# Patient Record
Sex: MALE | Race: WHITE | NOT HISPANIC OR LATINO | Employment: OTHER | URBAN - METROPOLITAN AREA
[De-identification: names, ages, dates, MRNs, and addresses within clinical notes are randomized per-mention and may not be internally consistent; named-entity substitution may affect disease eponyms.]

---

## 2017-01-08 ENCOUNTER — OFFICE VISIT (OUTPATIENT)
Dept: FAMILY MEDICINE | Facility: CLINIC | Age: 62
End: 2017-01-08
Payer: COMMERCIAL

## 2017-01-08 VITALS
HEIGHT: 70 IN | SYSTOLIC BLOOD PRESSURE: 131 MMHG | HEART RATE: 89 BPM | BODY MASS INDEX: 22.82 KG/M2 | DIASTOLIC BLOOD PRESSURE: 80 MMHG | WEIGHT: 159.38 LBS

## 2017-01-08 DIAGNOSIS — J06.9 UPPER RESPIRATORY TRACT INFECTION, UNSPECIFIED TYPE: Primary | ICD-10-CM

## 2017-01-08 PROCEDURE — 1159F MED LIST DOCD IN RCRD: CPT | Mod: S$GLB,,, | Performed by: FAMILY MEDICINE

## 2017-01-08 PROCEDURE — 99213 OFFICE O/P EST LOW 20 MIN: CPT | Mod: S$GLB,,, | Performed by: FAMILY MEDICINE

## 2017-01-08 PROCEDURE — 99999 PR PBB SHADOW E&M-EST. PATIENT-LVL III: CPT | Mod: PBBFAC,,, | Performed by: FAMILY MEDICINE

## 2017-01-15 NOTE — PROGRESS NOTES
HISTORY OF PRESENT ILLNESS:  A pleasant male with nasal congestion for several   days.  No aches, chills, fever, or cough.  No sore throat.  He has had some   symptoms of eustachian tube dysfunction.    PHYSICAL EXAMINATION:  HEENT:  He has nasal congestion.  No pharyngitis, exudate, or lymphadenopathy.    No otitis media.  CHEST:  Clear.    ASSESSMENT:  Upper respiratory infection with eustachian tube dysfunction.    PLAN:  We discussed decongestants, nasal sprays, humidification and followup.      [facility]  dd: 01/15/2017 09:33:52 (CST)  td: 01/15/2017 17:07:13 (CST)  Doc ID   #[id number]  Job ID #[id number]    CC:

## 2017-10-17 ENCOUNTER — OFFICE VISIT (OUTPATIENT)
Dept: UROLOGY | Facility: CLINIC | Age: 62
End: 2017-10-17
Payer: COMMERCIAL

## 2017-10-17 ENCOUNTER — LAB VISIT (OUTPATIENT)
Dept: LAB | Facility: HOSPITAL | Age: 62
End: 2017-10-17
Payer: COMMERCIAL

## 2017-10-17 VITALS
SYSTOLIC BLOOD PRESSURE: 126 MMHG | HEIGHT: 70 IN | BODY MASS INDEX: 22.98 KG/M2 | DIASTOLIC BLOOD PRESSURE: 70 MMHG | HEART RATE: 81 BPM | WEIGHT: 160.5 LBS

## 2017-10-17 DIAGNOSIS — N40.1 BPH WITH URINARY OBSTRUCTION: ICD-10-CM

## 2017-10-17 DIAGNOSIS — N13.8 BPH WITH URINARY OBSTRUCTION: ICD-10-CM

## 2017-10-17 DIAGNOSIS — N40.1 BPH WITH URINARY OBSTRUCTION: Primary | ICD-10-CM

## 2017-10-17 DIAGNOSIS — N13.8 BPH WITH URINARY OBSTRUCTION: Primary | ICD-10-CM

## 2017-10-17 LAB
BILIRUB SERPL-MCNC: NORMAL MG/DL
BLOOD URINE, POC: NORMAL
COLOR, POC UA: YELLOW
COMPLEXED PSA SERPL-MCNC: 1.1 NG/ML
GLUCOSE UR QL STRIP: NORMAL
KETONES UR QL STRIP: NORMAL
LEUKOCYTE ESTERASE URINE, POC: NORMAL
NITRITE, POC UA: NORMAL
PH, POC UA: NORMAL
PROTEIN, POC: NORMAL
SPECIFIC GRAVITY, POC UA: 7
UROBILINOGEN, POC UA: NORMAL

## 2017-10-17 PROCEDURE — 81002 URINALYSIS NONAUTO W/O SCOPE: CPT | Mod: S$GLB,,, | Performed by: NURSE PRACTITIONER

## 2017-10-17 PROCEDURE — 99999 PR PBB SHADOW E&M-EST. PATIENT-LVL III: CPT | Mod: PBBFAC,,, | Performed by: NURSE PRACTITIONER

## 2017-10-17 PROCEDURE — 36415 COLL VENOUS BLD VENIPUNCTURE: CPT

## 2017-10-17 PROCEDURE — 99214 OFFICE O/P EST MOD 30 MIN: CPT | Mod: 25,S$GLB,, | Performed by: NURSE PRACTITIONER

## 2017-10-17 PROCEDURE — 84153 ASSAY OF PSA TOTAL: CPT

## 2017-10-17 RX ORDER — TAMSULOSIN HYDROCHLORIDE 0.4 MG/1
0.4 CAPSULE ORAL NIGHTLY
Qty: 90 CAPSULE | Refills: 3 | Status: SHIPPED | OUTPATIENT
Start: 2017-10-17 | End: 2018-12-12 | Stop reason: SDUPTHER

## 2017-10-17 NOTE — PROGRESS NOTES
Subjective:       Patient ID: Fletcher Nunez is a 62 y.o. male.    Chief Complaint: Benign Prostatic Hypertrophy    Fletcher Nunez is a 62 y.o. Male with BPH.  He was last seen in clinic with Dr. Hurtado 10/03/2016.  He has been taking Flomax for his LUTS  He has no family history of Prostate Cancer.    He is pleased with how he urinates.  No issues with ED.                                     PSA                  0.82                09/29/2016                 PSA                  0.82                10/02/2015                 PSA                  0.93                10/01/2014                 PSA                  0.98                10/07/2013                 PSA                      0.88                10/24/2012                 PSA                      0.88                05/26/2010          Past Medical History:  No date: BPH (benign prostatic hyperplasia)  No date: Positive PPD, treated      Comment: hx of childhood     Past Surgical History:  No date: CHOLECYSTECTOMY    Review of patient's family history indicates:    Heart disease                  Father                    Diabetes                       Father                    Heart disease                  Sister                    Diabetes                       Sister                    Hyperlipidemia                 Sister                    Hypertension                   Sister                    Cancer                         Maternal Grandmother        Comment: stomach cancer     Cancer                         Sister                      Comment: renal cancer       Social History    Marital status:              Spouse name:                       Years of education:                 Number of children:               Occupational History    None on file    Social History Main Topics    Smoking status: Never Smoker                                                                   Smokeless tobacco: Not on file                       Alcohol use: Not on  file     Drug use: Not on file     Sexual activity: Not on file          Other Topics            Concern    None on file    Social History Narrative    None on file        Allergies:  Review of patient's allergies indicates no known allergies.    Medications:  Current Outpatient Prescriptions:   tamsulosin (FLOMAX) 0.4 mg Cp24, Take 1 capsule (0.4 mg total) by mouth every evening., Disp: 90 capsule, Rfl: 3        Review of Systems   Constitutional: Negative.  Negative for activity change, appetite change and fever.   HENT: Negative.  Negative for facial swelling and trouble swallowing.    Eyes: Negative.    Respiratory: Negative.  Negative for shortness of breath.    Cardiovascular: Negative.  Negative for chest pain and palpitations.   Gastrointestinal: Negative for abdominal pain, constipation, diarrhea, nausea and vomiting.   Genitourinary: Negative for difficulty urinating, dysuria, enuresis, flank pain, frequency, genital sores, hematuria, nocturia, penile pain, scrotal swelling, testicular pain and urgency.        Please with how he urinates.     Musculoskeletal: Negative for back pain, gait problem and neck stiffness.   Skin: Negative.  Negative for wound.   Neurological: Negative for dizziness, tremors, seizures, syncope, speech difficulty, light-headedness and headaches.   Hematological: Does not bruise/bleed easily.   Psychiatric/Behavioral: Negative for confusion and hallucinations. The patient is not nervous/anxious.        Objective:      Physical Exam   Nursing note and vitals reviewed.  Constitutional: He is oriented to person, place, and time. Vital signs are normal. He appears well-developed and well-nourished. He is active and cooperative.  Non-toxic appearance. He does not have a sickly appearance.   Urine dipped clear of infection in the office today     HENT:   Head: Normocephalic and atraumatic.   Right Ear: External ear normal.   Left Ear: External ear normal.   Nose: Nose normal.    Mouth/Throat: Mucous membranes are normal.   Eyes: Conjunctivae and lids are normal. No scleral icterus.   Neck: Trachea normal, normal range of motion and full passive range of motion without pain. Neck supple. No JVD present. No tracheal deviation present.   Cardiovascular: Normal rate, regular rhythm, S1 normal and S2 normal.    Pulmonary/Chest: Effort normal and breath sounds normal. No respiratory distress. He exhibits no tenderness.   Abdominal: Soft. Normal appearance and bowel sounds are normal. There is no hepatosplenomegaly. There is no tenderness. There is no rebound, no guarding and no CVA tenderness.   Genitourinary: Rectum normal, testes normal and penis normal. Rectal exam shows no external hemorrhoid, no mass and no tenderness. Prostate is enlarged.       Musculoskeletal: Normal range of motion.   Neurological: He is alert and oriented to person, place, and time. He has normal strength.   Skin: Skin is warm, dry and intact.     Psychiatric: He has a normal mood and affect. His behavior is normal. Judgment and thought content normal.       Assessment:       1. BPH with urinary obstruction        Plan:         I spent 25 minutes with the patient of which more than half was spent in direct consultation with the patient in regards to our treatment and plan.    Education and recommendations of today's plan of care including home remedies.  We discussed PSA results.  Diet modifications  Ok with how he urinates; continue Flomax;   He changing to medicare in January 2018; should not be problem but will let us know if pharmacy changes.  PSA today.  RTC one year with PSA

## 2017-10-18 ENCOUNTER — TELEPHONE (OUTPATIENT)
Dept: UROLOGY | Facility: CLINIC | Age: 62
End: 2017-10-18

## 2018-10-01 ENCOUNTER — LAB VISIT (OUTPATIENT)
Dept: LAB | Facility: HOSPITAL | Age: 63
End: 2018-10-01
Attending: NURSE PRACTITIONER
Payer: MEDICARE

## 2018-10-01 ENCOUNTER — CLINICAL SUPPORT (OUTPATIENT)
Dept: FAMILY MEDICINE | Facility: CLINIC | Age: 63
End: 2018-10-01
Payer: MEDICARE

## 2018-10-01 DIAGNOSIS — Z23 NEED FOR PROPHYLACTIC VACCINATION AND INOCULATION AGAINST INFLUENZA: Primary | ICD-10-CM

## 2018-10-01 DIAGNOSIS — N40.1 BPH WITH URINARY OBSTRUCTION: ICD-10-CM

## 2018-10-01 DIAGNOSIS — N13.8 BPH WITH URINARY OBSTRUCTION: ICD-10-CM

## 2018-10-01 LAB — COMPLEXED PSA SERPL-MCNC: 1.1 NG/ML

## 2018-10-01 PROCEDURE — 90662 IIV NO PRSV INCREASED AG IM: CPT | Mod: PBBFAC,PO | Performed by: INTERNAL MEDICINE

## 2018-10-01 PROCEDURE — 84153 ASSAY OF PSA TOTAL: CPT

## 2018-10-01 PROCEDURE — 36415 COLL VENOUS BLD VENIPUNCTURE: CPT | Mod: PO

## 2018-10-01 PROCEDURE — 99499 UNLISTED E&M SERVICE: CPT | Mod: S$PBB,,, | Performed by: INTERNAL MEDICINE

## 2018-10-09 ENCOUNTER — OFFICE VISIT (OUTPATIENT)
Dept: UROLOGY | Facility: CLINIC | Age: 63
End: 2018-10-09
Payer: MEDICARE

## 2018-10-09 VITALS
HEART RATE: 80 BPM | SYSTOLIC BLOOD PRESSURE: 128 MMHG | HEIGHT: 70 IN | BODY MASS INDEX: 23.6 KG/M2 | WEIGHT: 164.88 LBS | DIASTOLIC BLOOD PRESSURE: 79 MMHG

## 2018-10-09 DIAGNOSIS — N13.8 BPH WITH URINARY OBSTRUCTION: Primary | ICD-10-CM

## 2018-10-09 DIAGNOSIS — N40.1 BPH WITH URINARY OBSTRUCTION: Primary | ICD-10-CM

## 2018-10-09 PROCEDURE — 99999 PR PBB SHADOW E&M-EST. PATIENT-LVL III: CPT | Mod: PBBFAC,,, | Performed by: UROLOGY

## 2018-10-09 PROCEDURE — 1101F PT FALLS ASSESS-DOCD LE1/YR: CPT | Mod: CPTII,,, | Performed by: UROLOGY

## 2018-10-09 PROCEDURE — 99213 OFFICE O/P EST LOW 20 MIN: CPT | Mod: PBBFAC,PO | Performed by: UROLOGY

## 2018-10-09 PROCEDURE — 99214 OFFICE O/P EST MOD 30 MIN: CPT | Mod: S$PBB,,, | Performed by: UROLOGY

## 2018-10-09 PROCEDURE — 3008F BODY MASS INDEX DOCD: CPT | Mod: CPTII,,, | Performed by: UROLOGY

## 2018-10-09 NOTE — PROGRESS NOTES
Subjective:       Patient ID: Fletcher Nunez is a 63 y.o. male.    Chief Complaint: Benign Prostatic Hypertrophy (annual visit)    HPI patient is here for prostate check.  His PSA is 1.1.  He is happy with his voiding.  He is on Flomax.    Past Medical History:   Diagnosis Date    BPH (benign prostatic hyperplasia)     Positive PPD, treated     hx of childhood        Past Surgical History:   Procedure Laterality Date    CHOLECYSTECTOMY         Family History   Problem Relation Age of Onset    Heart disease Father     Diabetes Father     Heart disease Sister     Diabetes Sister     Hyperlipidemia Sister     Hypertension Sister     Cancer Maternal Grandmother         stomach cancer     Cancer Sister         renal cancer        Social History     Socioeconomic History    Marital status:      Spouse name: Not on file    Number of children: Not on file    Years of education: Not on file    Highest education level: Not on file   Social Needs    Financial resource strain: Not on file    Food insecurity - worry: Not on file    Food insecurity - inability: Not on file    Transportation needs - medical: Not on file    Transportation needs - non-medical: Not on file   Occupational History    Not on file   Tobacco Use    Smoking status: Never Smoker   Substance and Sexual Activity    Alcohol use: Not on file    Drug use: Not on file    Sexual activity: Not on file   Other Topics Concern    Not on file   Social History Narrative    Not on file       Allergies:  Patient has no known allergies.    Medications:    Current Outpatient Medications:     tamsulosin (FLOMAX) 0.4 mg Cp24, Take 1 capsule (0.4 mg total) by mouth every evening., Disp: 90 capsule, Rfl: 3    Review of Systems   Constitutional: Negative for activity change, appetite change, chills, diaphoresis, fatigue, fever and unexpected weight change.   HENT: Negative for congestion, dental problem, hearing loss, mouth sores, postnasal drip,  rhinorrhea, sinus pressure and trouble swallowing.    Eyes: Negative for pain, discharge and itching.   Respiratory: Negative for apnea, cough, choking, chest tightness, shortness of breath and wheezing.    Cardiovascular: Negative for chest pain, palpitations and leg swelling.   Gastrointestinal: Negative for abdominal distention, abdominal pain, anal bleeding, blood in stool, constipation, diarrhea, nausea, rectal pain and vomiting.   Endocrine: Negative for polydipsia and polyuria.   Genitourinary: Negative for decreased urine volume, difficulty urinating, discharge, dysuria, enuresis, flank pain, frequency, genital sores, hematuria, penile pain, penile swelling, scrotal swelling, testicular pain and urgency.   Musculoskeletal: Negative for arthralgias, back pain and myalgias.   Skin: Negative for color change, rash and wound.   Neurological: Negative for dizziness, syncope, speech difficulty, light-headedness and headaches.   Hematological: Negative for adenopathy. Does not bruise/bleed easily.   Psychiatric/Behavioral: Negative for behavioral problems, confusion, hallucinations and sleep disturbance.       Objective:      Physical Exam   Constitutional: He appears well-developed.   HENT:   Head: Normocephalic.   Cardiovascular: Normal rate.    Pulmonary/Chest: Effort normal.   Abdominal: Soft.   Genitourinary: Prostate normal.   Genitourinary Comments: 30 g benign   Neurological: He is alert.   Skin: Skin is warm.     Psychiatric: He has a normal mood and affect.       Assessment:       1. BPH with urinary obstruction        Plan:       Fletcher was seen today for benign prostatic hypertrophy.    Diagnoses and all orders for this visit:    BPH with urinary obstruction      yearly check

## 2018-12-12 RX ORDER — TAMSULOSIN HYDROCHLORIDE 0.4 MG/1
CAPSULE ORAL
Qty: 90 CAPSULE | Refills: 3 | Status: SHIPPED | OUTPATIENT
Start: 2018-12-12 | End: 2019-12-23

## 2019-09-24 ENCOUNTER — IMMUNIZATION (OUTPATIENT)
Dept: FAMILY MEDICINE | Facility: CLINIC | Age: 64
End: 2019-09-24
Payer: MEDICARE

## 2019-09-24 ENCOUNTER — OFFICE VISIT (OUTPATIENT)
Dept: UROLOGY | Facility: CLINIC | Age: 64
End: 2019-09-24
Payer: MEDICARE

## 2019-09-24 VITALS
DIASTOLIC BLOOD PRESSURE: 74 MMHG | SYSTOLIC BLOOD PRESSURE: 118 MMHG | BODY MASS INDEX: 22.73 KG/M2 | HEIGHT: 70 IN | HEART RATE: 79 BPM | WEIGHT: 158.75 LBS

## 2019-09-24 DIAGNOSIS — Z23 NEED FOR PROPHYLACTIC VACCINATION AND INOCULATION AGAINST INFLUENZA: Primary | ICD-10-CM

## 2019-09-24 DIAGNOSIS — N13.8 BPH WITH URINARY OBSTRUCTION: Primary | ICD-10-CM

## 2019-09-24 DIAGNOSIS — N40.1 BPH WITH URINARY OBSTRUCTION: Primary | ICD-10-CM

## 2019-09-24 PROCEDURE — G0008 ADMIN INFLUENZA VIRUS VAC: HCPCS | Mod: HCNC,S$GLB,, | Performed by: INTERNAL MEDICINE

## 2019-09-24 PROCEDURE — 99499 UNLISTED E&M SERVICE: CPT | Mod: HCNC,S$GLB,, | Performed by: INTERNAL MEDICINE

## 2019-09-24 PROCEDURE — 90662 FLU VACCINE - HIGH DOSE (65+) PRESERVATIVE FREE IM: ICD-10-PCS | Mod: HCNC,S$GLB,, | Performed by: INTERNAL MEDICINE

## 2019-09-24 PROCEDURE — 1101F PT FALLS ASSESS-DOCD LE1/YR: CPT | Mod: HCNC,CPTII,S$GLB, | Performed by: UROLOGY

## 2019-09-24 PROCEDURE — 99214 PR OFFICE/OUTPT VISIT, EST, LEVL IV, 30-39 MIN: ICD-10-PCS | Mod: 25,HCNC,S$GLB, | Performed by: UROLOGY

## 2019-09-24 PROCEDURE — 99999 PR PBB SHADOW E&M-EST. PATIENT-LVL III: ICD-10-PCS | Mod: PBBFAC,HCNC,, | Performed by: UROLOGY

## 2019-09-24 PROCEDURE — 99999 PR PBB SHADOW E&M-EST. PATIENT-LVL III: CPT | Mod: PBBFAC,HCNC,, | Performed by: UROLOGY

## 2019-09-24 PROCEDURE — G0008 FLU VACCINE - HIGH DOSE (65+) PRESERVATIVE FREE IM: ICD-10-PCS | Mod: HCNC,S$GLB,, | Performed by: INTERNAL MEDICINE

## 2019-09-24 PROCEDURE — 99499 NO LOS: ICD-10-PCS | Mod: HCNC,S$GLB,, | Performed by: INTERNAL MEDICINE

## 2019-09-24 PROCEDURE — 99214 OFFICE O/P EST MOD 30 MIN: CPT | Mod: 25,HCNC,S$GLB, | Performed by: UROLOGY

## 2019-09-24 PROCEDURE — 90662 IIV NO PRSV INCREASED AG IM: CPT | Mod: HCNC,S$GLB,, | Performed by: INTERNAL MEDICINE

## 2019-09-24 PROCEDURE — 1101F PR PT FALLS ASSESS DOC 0-1 FALLS W/OUT INJ PAST YR: ICD-10-PCS | Mod: HCNC,CPTII,S$GLB, | Performed by: UROLOGY

## 2019-09-24 NOTE — PROGRESS NOTES
Subjective:       Patient ID: Fletcher Nunez is a 64 y.o. male.    Chief Complaint: No chief complaint on file.    HPI   Fletcher Nunez is a 64 y.o. male with a PMHx of BPH and elevated PSA who presents to the clinic for his annual prostate evaluation. His PSA 11 months ago was 1.1 and his AUA Sx score is a 5. He is taking Flomax and does not have a FHx of prostate CA. He feels well today.      Past Medical History:   Diagnosis Date    BPH (benign prostatic hyperplasia)     Positive PPD, treated     hx of childhood        Past Surgical History:   Procedure Laterality Date    CHOLECYSTECTOMY         Family History   Problem Relation Age of Onset    Heart disease Father     Diabetes Father     Heart disease Sister     Diabetes Sister     Hyperlipidemia Sister     Hypertension Sister     Cancer Maternal Grandmother         stomach cancer     Cancer Sister         renal cancer        Social History     Socioeconomic History    Marital status:      Spouse name: Not on file    Number of children: Not on file    Years of education: Not on file    Highest education level: Not on file   Occupational History    Not on file   Social Needs    Financial resource strain: Not on file    Food insecurity:     Worry: Not on file     Inability: Not on file    Transportation needs:     Medical: Not on file     Non-medical: Not on file   Tobacco Use    Smoking status: Never Smoker   Substance and Sexual Activity    Alcohol use: Not on file    Drug use: Not on file    Sexual activity: Not on file   Lifestyle    Physical activity:     Days per week: Not on file     Minutes per session: Not on file    Stress: Not on file   Relationships    Social connections:     Talks on phone: Not on file     Gets together: Not on file     Attends [employer] service: Not on file     Active member of club or organization: Not on file     Attends meetings of clubs or organizations: Not on file     Relationship status: Not on  file   Other Topics Concern    Not on file   Social History Narrative    Not on file       Allergies:  Patient has no known allergies.    Medications:    Current Outpatient Medications:     tamsulosin (FLOMAX) 0.4 mg Cap, TAKE 1 CAPSULE AT BEDTIME, Disp: 90 capsule, Rfl: 3    Review of Systems   Constitutional: Negative for activity change, appetite change, chills, diaphoresis, fatigue, fever and unexpected weight change.   HENT: Negative for congestion, dental problem, hearing loss, mouth sores, postnasal drip, rhinorrhea, sinus pressure and trouble swallowing.    Eyes: Negative for pain, discharge and itching.   Respiratory: Negative for apnea, cough, choking, chest tightness, shortness of breath and wheezing.    Cardiovascular: Negative for chest pain, palpitations and leg swelling.   Gastrointestinal: Negative for abdominal distention, abdominal pain, anal bleeding, blood in stool, constipation, diarrhea, nausea, rectal pain and vomiting.   Endocrine: Negative for polydipsia and polyuria.   Genitourinary: Negative for decreased urine volume, difficulty urinating, discharge, dysuria, enuresis, flank pain, frequency, genital sores, hematuria, penile pain, penile swelling, scrotal swelling and urgency.   Musculoskeletal: Negative for arthralgias and back pain.   Skin: Negative for color change, rash and wound.   Neurological: Negative for dizziness, syncope, speech difficulty, light-headedness and headaches.   Hematological: Negative for adenopathy. Does not bruise/bleed easily.   Psychiatric/Behavioral: Negative for behavioral problems and confusion.       Objective:      Physical Exam   Constitutional: He appears well-developed.   HENT:   Head: Normocephalic.   Neck: Neck supple.   Cardiovascular: Normal rate.    Pulmonary/Chest: Effort normal.   Abdominal: Soft.   Genitourinary:   Genitourinary Comments: Prostate was smooth without nodularity. No rectal masses.  30-35 grams, benign. Normal perineum.      Neurological: He is alert.   Skin: Skin is warm.     Psychiatric: He has a normal mood and affect.         Labs   Ref Range & Units 11mo ago   PSA DIAGNOSTIC 0.00 - 4.00 ng/mL 1.1        Assessment:       1. BPH with urinary obstruction        Plan:       Diagnoses and all orders for this visit:    BPH with urinary obstruction  -     Prostate Specific Antigen, Diagnostic; Future          RTC in 1 year for VAISHALI    ICammie, am acting as a scribe on this patient encounter in the presence and under the supervision of Dr. Hurtado.    09/24/2019 9:06 AM    I, Dr. Hurtado, personally performed the services described in this documentation.   All medical record entries made by the scribe were at my direction and in my presence.   I have reviewed the chart and agree that the record is accurate and complete.   Barrington Hurtado MD.  9:06 AM 09/24/2019

## 2019-12-23 RX ORDER — TAMSULOSIN HYDROCHLORIDE 0.4 MG/1
CAPSULE ORAL
Qty: 90 CAPSULE | Refills: 3 | Status: SHIPPED | OUTPATIENT
Start: 2019-12-23 | End: 2020-12-07 | Stop reason: SDUPTHER

## 2020-04-23 ENCOUNTER — LAB VISIT (OUTPATIENT)
Dept: LAB | Facility: HOSPITAL | Age: 65
End: 2020-04-23
Attending: UROLOGY
Payer: MEDICARE

## 2020-04-23 ENCOUNTER — TELEPHONE (OUTPATIENT)
Dept: UROLOGY | Facility: CLINIC | Age: 65
End: 2020-04-23

## 2020-04-23 DIAGNOSIS — R30.0 DYSURIA: ICD-10-CM

## 2020-04-23 DIAGNOSIS — N40.1 BPH WITH URINARY OBSTRUCTION: Primary | ICD-10-CM

## 2020-04-23 DIAGNOSIS — N13.8 BPH WITH URINARY OBSTRUCTION: Primary | ICD-10-CM

## 2020-04-23 LAB
BILIRUB UR QL STRIP: NEGATIVE
CLARITY UR REFRACT.AUTO: CLEAR
COLOR UR AUTO: YELLOW
GLUCOSE UR QL STRIP: NEGATIVE
HGB UR QL STRIP: ABNORMAL
KETONES UR QL STRIP: NEGATIVE
LEUKOCYTE ESTERASE UR QL STRIP: NEGATIVE
MICROSCOPIC COMMENT: NORMAL
NITRITE UR QL STRIP: NEGATIVE
PH UR STRIP: 6 [PH] (ref 5–8)
PROT UR QL STRIP: NEGATIVE
RBC #/AREA URNS AUTO: 2 /HPF (ref 0–4)
SP GR UR STRIP: 1.02 (ref 1–1.03)
URN SPEC COLLECT METH UR: ABNORMAL
WBC #/AREA URNS AUTO: 0 /HPF (ref 0–5)

## 2020-04-23 PROCEDURE — 81001 URINALYSIS AUTO W/SCOPE: CPT | Mod: HCNC

## 2020-04-23 PROCEDURE — 87086 URINE CULTURE/COLONY COUNT: CPT | Mod: HCNC

## 2020-04-23 NOTE — TELEPHONE ENCOUNTER
----- Message from Rupinder Suresh sent at 4/23/2020  9:34 AM CDT -----  Contact: self  Type:  Same Day  Virtual Appointment Request    Name of Caller:patient states experiencing pain in the abdomen possible bladder infection need virtual visit today  When is the first available appointment?  Symptoms  Best Call Back Number [phone]  Additional Information:

## 2020-04-23 NOTE — TELEPHONE ENCOUNTER
Advised patient that dr. nelson recommends dropping off a urine sample for UA and culture. Patient agreed and understood

## 2020-04-24 LAB — BACTERIA UR CULT: NO GROWTH

## 2020-05-10 ENCOUNTER — OFFICE VISIT (OUTPATIENT)
Dept: URGENT CARE | Facility: CLINIC | Age: 65
End: 2020-05-10
Payer: MEDICARE

## 2020-05-10 VITALS
HEART RATE: 104 BPM | HEIGHT: 70 IN | BODY MASS INDEX: 22.62 KG/M2 | DIASTOLIC BLOOD PRESSURE: 71 MMHG | OXYGEN SATURATION: 96 % | TEMPERATURE: 98 F | WEIGHT: 158 LBS | SYSTOLIC BLOOD PRESSURE: 118 MMHG

## 2020-05-10 DIAGNOSIS — R10.30 LOWER ABDOMINAL PAIN: Primary | ICD-10-CM

## 2020-05-10 DIAGNOSIS — R05.9 COUGH: ICD-10-CM

## 2020-05-10 DIAGNOSIS — R82.90 ABNORMAL FINDING ON URINALYSIS: ICD-10-CM

## 2020-05-10 LAB
BILIRUB UR QL STRIP: POSITIVE
GLUCOSE UR QL STRIP: NEGATIVE
KETONES UR QL STRIP: POSITIVE
LEUKOCYTE ESTERASE UR QL STRIP: NEGATIVE
PH, POC UA: 5 (ref 5–8)
POC BLOOD, URINE: POSITIVE
POC NITRATES, URINE: NEGATIVE
PROT UR QL STRIP: POSITIVE
SP GR UR STRIP: 1.02 (ref 1–1.03)
UROBILINOGEN UR STRIP-ACNC: ABNORMAL (ref 0.3–2.2)

## 2020-05-10 PROCEDURE — 71046 X-RAY EXAM CHEST 2 VIEWS: CPT | Mod: S$GLB,,, | Performed by: RADIOLOGY

## 2020-05-10 PROCEDURE — U0002 COVID-19 LAB TEST NON-CDC: HCPCS | Mod: HCNC

## 2020-05-10 PROCEDURE — 99204 PR OFFICE/OUTPT VISIT, NEW, LEVL IV, 45-59 MIN: ICD-10-PCS | Mod: 25,S$GLB,, | Performed by: NURSE PRACTITIONER

## 2020-05-10 PROCEDURE — 71046 XR CHEST PA AND LATERAL: ICD-10-PCS | Mod: S$GLB,,, | Performed by: RADIOLOGY

## 2020-05-10 PROCEDURE — 81003 URINALYSIS AUTO W/O SCOPE: CPT | Mod: QW,S$GLB,, | Performed by: NURSE PRACTITIONER

## 2020-05-10 PROCEDURE — 99204 OFFICE O/P NEW MOD 45 MIN: CPT | Mod: 25,S$GLB,, | Performed by: NURSE PRACTITIONER

## 2020-05-10 PROCEDURE — 81003 POCT URINALYSIS, DIPSTICK, AUTOMATED, W/O SCOPE: ICD-10-PCS | Mod: QW,S$GLB,, | Performed by: NURSE PRACTITIONER

## 2020-05-10 NOTE — PATIENT INSTRUCTIONS
General Referral to [facility]   You were referred to [facility] for Follow-up Care.    Please call [phone] to schedule your appointment.    Please go to the Emergency Department for any concerns or worsening of condition.  Please follow up with your primary care doctor or specialist in the next 48-72hrs as needed.    If you  smoke, please stop smoking.                                                  Urinary Symptoms   If your condition worsens or fails to improve we recommend that you receive another evaluation at the ER immediately or contact your PCP to discuss your concerns or return here. You must understand that you've received an urgent care treatment only and that you may be released before all your medical problems are known or treated. You the patient will arrange for followup care as instructed.   If you were prescribed antibiotics, please take them to full completion.    If you had cultures done it will take 3-5 days to result. We will call you with the result.   Cranberry juice may help. Get the 100% cranberry juice and mix 4 oz of juice with 4 oz of water and drink this 8 oz glass of liquid once a day.      Instructions for Patients with Confirmed or Suspected COVID-19    If you are awaiting your test result, you will either be called or it will be released to the patient portal.  If you have any questions about your test, please visit [email or web address] or call our COVID-19 information line at [phone].       Stay home and stay away from family members and friends. The CDC says, you can leave home after these three things have happened: 1) You have had no fever for at least 72 hours (that is three full days of no fever without the use of medicine that reduces fevers) 2) AND other symptoms have improved (for example, when your cough or shortness of breath have improved) 3) AND at least 7 days have passed since your symptoms first appeared.   Separate yourself  from other people and animals in your home.   Call ahead before visiting your doctor.   Wear a facemask.   Cover your coughs and sneezes.   Wash your hands often with soap and water; hand  can be used, too.   Avoid sharing personal household items.   Wipe down surfaces used daily.   Monitor your symptoms. Seek prompt medical attention if your illness is worsening (e.g., difficulty breathing).    Before seeking care, call your healthcare provider.   If you have a medical emergency and need to call 911, notify the dispatch personnel that you have, or are being evaluated for COVID-19. If possible, put on a facemask before emergency medical services arrive.        Recommended precautions for household members, intimate partners, and caregivers in a home setting of a patient with symptomatic laboratory-confirmed COVID-19 or a patient under investigation.  Household members, intimate partners, and caregivers in the home setting awaiting tests results have close contact with a person with symptomatic, laboratory-confirmed COVID-19 or a person under investigation. Close contacts should monitor their health; they should call their provider right away if they develop symptoms suggestive of COVID-19 (e.g., fever, cough, shortness of breath).    Close contacts should also follow these recommendations:   Make sure that you understand and can help the patient follow their provider's instructions for medication(s) and care. You should help the patient with basic needs in the home and provide support for getting groceries, prescriptions, and other personal needs.   Monitor the patient's symptoms. If the patient is getting sicker, call his or her healthcare provider and tell them that the patient has laboratory-confirmed COVID-19. If the patient has a medical emergency and you need to call 911, notify the dispatch personnel that the patient has, or is being evaluated for COVID-19.   Household members should stay  in another room or be  from the patient. Household members should use a separate bedroom and bathroom, if available.   Prohibit visitors.   Household members should care for any pets in the home.   Make sure that shared spaces in the home have good air flow, such as by an air conditioner or an opened window, weather permitting.   Perform hand hygiene frequently. Wash your hands often with soap and water for at least 20 seconds or use an alcohol-based hand  (that contains > 60% alcohol) covering all surfaces of your hands and rubbing them together until they feel dry. Soap and water should be used preferentially.   Avoid touching your eyes, nose, and mouth.   The patient should wear a facemask. If the patient is not able to wear a facemask (for example, because it causes trouble breathing), caregivers should wear a mask when they are in the same room as the patient.   Wear a disposable facemask and gloves when you touch or have contact with the patient's blood, stool, or body fluids, such as saliva, sputum, nasal mucus, vomit, urine.  o Throw out disposable facemasks and gloves after using them. Do not reuse.  o When removing personal protective equipment, first remove and dispose of gloves. Then, immediately clean your hands with soap and water or alcohol-based hand . Next, remove and dispose of facemask, and immediately clean your hands again with soap and water or alcohol-based hand .   You should not share dishes, drinking glasses, cups, eating utensils, towels, bedding, or other items with the patient. After the patient uses these items, you should wash them thoroughly (see below Wash laundry thoroughly).   Clean all high-touch surfaces, such as counters, tabletops, doorknobs, bathroom fixtures, toilets, phones, keyboards, tablets, and bedside tables, every day. Also, clean any surfaces that may have blood, stool, or body fluids on them.   Use a household  cleaning spray or wipe, according to the label instructions. Labels contain instructions for safe and effective use of the cleaning product including precautions you should take when applying the product, such as wearing gloves and making sure you have good ventilation during use of the product.   Wash laundry thoroughly.  o Immediately remove and wash clothes or bedding that have blood, stool, or body fluids on them.  o Wear disposable gloves while handling soiled items and keep soiled items away from your body. Clean your hands (with soap and water or an alcohol-based hand ) immediately after removing your gloves.  o Read and follow directions on labels of laundry or clothing items and detergent. In general, using a normal laundry detergent according to washing machine instructions and dry thoroughly using the warmest temperatures recommended on the clothing label.   Place all used disposable gloves, facemasks, and other contaminated items in a lined container before disposing of them with other household waste. Clean your hands (with soap and water or an alcohol-based hand ) immediately after handling these items. Soap and water should be used preferentially if hands are visibly dirty.   Discuss any additional questions with your state or local health department or healthcare provider. Check available hours when contacting your local health department.    For more information see CDC link below.      https://www.cdc.gov/coronavirus/2019-ncov/hcp/guidance-prevent-spread.html#precautions        Sources:  [facility], [place] Department of Health and \[facility] [place] [facility]\""

## 2020-05-10 NOTE — PROGRESS NOTES
"Subjective:       Patient ID: Fletcher Nunez is a 65 y.o. male.    Vitals:  height is 5' 10" (1.778 m) and weight is 71.7 kg (158 lb). His temperature is 98.1 °F (36.7 °C). His blood pressure is 118/71 and his pulse is 104. His oxygen saturation is 96%.     Chief Complaint: Fever and Chills    Patient presents with fever chills decrease in urination with darker in color and bladder pressure x three weeks. Covid neg on April 30. Urine test last week was negative. Patient has tried Tylenol over the counter with little to no relief.     Fever    This is a new problem. The current episode started 1 to 4 weeks ago. The problem occurs constantly. The problem has been gradually worsening. His temperature was unmeasured prior to arrival. The temperature was taken using an oral thermometer. Associated symptoms include abdominal pain, congestion and coughing. Pertinent negatives include no nausea, rash, urinary pain or vomiting. He has tried acetaminophen for the symptoms. The treatment provided mild relief.       Constitution: Positive for chills and fever.   HENT: Positive for congestion.    Neck: Negative for painful lymph nodes.   Respiratory: Positive for cough.    Gastrointestinal: Positive for abdominal pain. Negative for nausea and vomiting.   Genitourinary: Negative for dysuria, frequency, urgency, urine decreased, hematuria, history of kidney stones, genital trauma, painful intercourse, genital sore, penile discharge, painful ejaculation, penile pain, penile swelling, scrotal swelling and testicular pain.        Denies any testicular pain or burning with urination.    Musculoskeletal: Negative for back pain.   Skin: Negative for rash and lesion.   Hematologic/Lymphatic: Negative for swollen lymph nodes.       Objective:      Physical Exam   Constitutional: He is oriented to person, place, and time. Vital signs are normal. He appears well-developed and well-nourished. He is cooperative.  Non-toxic appearance. He does " not have a sickly appearance. He does not appear ill. No distress.   HENT:   Head: Normocephalic and atraumatic.   Right Ear: Hearing, tympanic membrane, external ear and ear canal normal.   Left Ear: Hearing, tympanic membrane, external ear and ear canal normal.   Nose: Nose normal. No mucosal edema, rhinorrhea or nasal deformity. No epistaxis. Right sinus exhibits no maxillary sinus tenderness and no frontal sinus tenderness. Left sinus exhibits no maxillary sinus tenderness and no frontal sinus tenderness.   Eyes: Pupils are equal, round, and reactive to light. Conjunctivae, EOM and lids are normal. Right eye exhibits no discharge. Left eye exhibits no discharge. No scleral icterus.   Neck: Trachea normal, normal range of motion, full passive range of motion without pain and phonation normal. Neck supple.   Cardiovascular: Normal rate, regular rhythm, S1 normal, S2 normal, normal heart sounds, intact distal pulses and normal pulses.   Pulmonary/Chest: Effort normal and breath sounds normal. No respiratory distress. He has no decreased breath sounds. He has no wheezes. He has no rhonchi. He has no rales.   Abdominal: Soft. Normal appearance and bowel sounds are normal. He exhibits no distension, no pulsatile midline mass and no mass. There is no hepatosplenomegaly. There is tenderness in the suprapubic area. There is no rigidity, no rebound, no guarding, no CVA tenderness, no tenderness at McBurney's point and negative Maravilla's sign.   Musculoskeletal: Normal range of motion. He exhibits no edema or deformity.   Lymphadenopathy:     He has no cervical adenopathy.   Neurological: He is alert and oriented to person, place, and time. He has normal strength. He is not disoriented. He exhibits normal muscle tone. He displays a negative Romberg sign. Coordination normal.   Skin: Skin is warm, dry, intact, not diaphoretic and not pale.   Psychiatric: He has a normal mood and affect. His speech is normal and behavior is  normal. Judgment and thought content normal. Cognition and memory are normal.   Nursing note and vitals reviewed.        65 year old with  Complaint of adominall pain, cough congestion, fever (highest 98.8)..  Reports night sweats the other night.  States he took Tylenol but it does not resolve fever.  Reports he has had the FLU before but this does not feel the same.    Denies NVD, Loss of taste or smell, sore throat  Denies exposure to anyone with similar symptoms or positive COVID-19.    Reports he reached out to Dr. French's office who advised to give a urine example.     X-ray Chest Pa And Lateral    Result Date: 5/10/2020  EXAMINATION: XR CHEST PA AND LATERAL CLINICAL HISTORY: Cough TECHNIQUE: PA and lateral views of the chest were performed. COMPARISON: 03/25/2013 FINDINGS: The lungs are clear, with normal appearance of pulmonary vasculature and no pleural effusion or pneumothorax. The cardiac silhouette is normal in size. The hilar and mediastinal contours are unremarkable. Bones are intact.     No acute abnormality. Electronically signed by: Marissa Roberson MD Date:    05/10/2020 Time:    12:47    Assessment:       1. Lower abdominal pain    2. Cough    3. Abnormal finding on urinalysis        Plan:         Lower abdominal pain  -     POCT Urinalysis, Dipstick, Automated, W/O Scope  -     Urine culture  -     Ambulatory referral/consult to Urology    Cough  -     X-Ray Chest PA And Lateral; Future; Expected date: 05/10/2020  -     COVID-19 Routine Screening    Abnormal finding on urinalysis  -     Ambulatory referral/consult to Urology      Patient Instructions     General Referral to [facility]   You were referred to [facility] for Follow-up Care.    Please call [phone] to schedule your appointment.    Please go to the Emergency Department for any concerns or worsening of condition.  Please follow up with your primary care doctor or specialist in the next 48-72hrs as needed.    If you   smoke, please stop smoking.                                                  Urinary Symptoms   If your condition worsens or fails to improve we recommend that you receive another evaluation at the ER immediately or contact your PCP to discuss your concerns or return here. You must understand that you've received an urgent care treatment only and that you may be released before all your medical problems are known or treated. You the patient will arrange for followup care as instructed.   If you were prescribed antibiotics, please take them to full completion.    If you had cultures done it will take 3-5 days to result. We will call you with the result.   Cranberry juice may help. Get the 100% cranberry juice and mix 4 oz of juice with 4 oz of water and drink this 8 oz glass of liquid once a day.      Instructions for Patients with Confirmed or Suspected COVID-19    If you are awaiting your test result, you will either be called or it will be released to the patient portal.  If you have any questions about your test, please visit [email or web address] or call our COVID-19 information line at [phone].       Stay home and stay away from family members and friends. The CDC says, you can leave home after these three things have happened: 1) You have had no fever for at least 72 hours (that is three full days of no fever without the use of medicine that reduces fevers) 2) AND other symptoms have improved (for example, when your cough or shortness of breath have improved) 3) AND at least 7 days have passed since your symptoms first appeared.   Separate yourself from other people and animals in your home.   Call ahead before visiting your doctor.   Wear a facemask.   Cover your coughs and sneezes.   Wash your hands often with soap and water; hand  can be used, too.   Avoid sharing personal household items.   Wipe down surfaces used daily.   Monitor your symptoms. Seek prompt medical attention  if your illness is worsening (e.g., difficulty breathing).    Before seeking care, call your healthcare provider.   If you have a medical emergency and need to call 911, notify the dispatch personnel that you have, or are being evaluated for COVID-19. If possible, put on a facemask before emergency medical services arrive.        Recommended precautions for household members, intimate partners, and caregivers in a home setting of a patient with symptomatic laboratory-confirmed COVID-19 or a patient under investigation.  Household members, intimate partners, and caregivers in the home setting awaiting tests results have close contact with a person with symptomatic, laboratory-confirmed COVID-19 or a person under investigation. Close contacts should monitor their health; they should call their provider right away if they develop symptoms suggestive of COVID-19 (e.g., fever, cough, shortness of breath).    Close contacts should also follow these recommendations:   Make sure that you understand and can help the patient follow their provider's instructions for medication(s) and care. You should help the patient with basic needs in the home and provide support for getting groceries, prescriptions, and other personal needs.   Monitor the patient's symptoms. If the patient is getting sicker, call his or her healthcare provider and tell them that the patient has laboratory-confirmed COVID-19. If the patient has a medical emergency and you need to call 911, notify the dispatch personnel that the patient has, or is being evaluated for COVID-19.   Household members should stay in another room or be  from the patient. Household members should use a separate bedroom and bathroom, if available.   Prohibit visitors.   Household members should care for any pets in the home.   Make sure that shared spaces in the home have good air flow, such as by an air conditioner or an opened window, weather permitting.   Perform  hand hygiene frequently. Wash your hands often with soap and water for at least 20 seconds or use an alcohol-based hand  (that contains > 60% alcohol) covering all surfaces of your hands and rubbing them together until they feel dry. Soap and water should be used preferentially.   Avoid touching your eyes, nose, and mouth.   The patient should wear a facemask. If the patient is not able to wear a facemask (for example, because it causes trouble breathing), caregivers should wear a mask when they are in the same room as the patient.   Wear a disposable facemask and gloves when you touch or have contact with the patient's blood, stool, or body fluids, such as saliva, sputum, nasal mucus, vomit, urine.  o Throw out disposable facemasks and gloves after using them. Do not reuse.  o When removing personal protective equipment, first remove and dispose of gloves. Then, immediately clean your hands with soap and water or alcohol-based hand . Next, remove and dispose of facemask, and immediately clean your hands again with soap and water or alcohol-based hand .   You should not share dishes, drinking glasses, cups, eating utensils, towels, bedding, or other items with the patient. After the patient uses these items, you should wash them thoroughly (see below Wash laundry thoroughly).   Clean all high-touch surfaces, such as counters, tabletops, doorknobs, bathroom fixtures, toilets, phones, keyboards, tablets, and bedside tables, every day. Also, clean any surfaces that may have blood, stool, or body fluids on them.   Use a household cleaning spray or wipe, according to the label instructions. Labels contain instructions for safe and effective use of the cleaning product including precautions you should take when applying the product, such as wearing gloves and making sure you have good ventilation during use of the product.   Wash laundry thoroughly.  o Immediately remove and wash  clothes or bedding that have blood, stool, or body fluids on them.  o Wear disposable gloves while handling soiled items and keep soiled items away from your body. Clean your hands (with soap and water or an alcohol-based hand ) immediately after removing your gloves.  o Read and follow directions on labels of laundry or clothing items and detergent. In general, using a normal laundry detergent according to washing machine instructions and dry thoroughly using the warmest temperatures recommended on the clothing label.   Place all used disposable gloves, facemasks, and other contaminated items in a lined container before disposing of them with other household waste. Clean your hands (with soap and water or an alcohol-based hand ) immediately after handling these items. Soap and water should be used preferentially if hands are visibly dirty.   Discuss any additional questions with your state or local health department or healthcare provider. Check available hours when contacting your local health department.    For more information see CDC link below.      https://www.cdc.gov/coronavirus/2019-ncov/hcp/guidance-prevent-spread.html#precautions        Sources:  [facility] [place][note 2], [facility] and [facility]

## 2020-05-11 ENCOUNTER — OFFICE VISIT (OUTPATIENT)
Dept: UROLOGY | Facility: CLINIC | Age: 65
End: 2020-05-11
Payer: MEDICARE

## 2020-05-11 ENCOUNTER — TELEPHONE (OUTPATIENT)
Dept: UROLOGY | Facility: CLINIC | Age: 65
End: 2020-05-11

## 2020-05-11 ENCOUNTER — TELEPHONE (OUTPATIENT)
Dept: URGENT CARE | Facility: CLINIC | Age: 65
End: 2020-05-11

## 2020-05-11 VITALS
DIASTOLIC BLOOD PRESSURE: 78 MMHG | TEMPERATURE: 98 F | SYSTOLIC BLOOD PRESSURE: 121 MMHG | WEIGHT: 158 LBS | BODY MASS INDEX: 22.62 KG/M2 | HEART RATE: 107 BPM | HEIGHT: 70 IN

## 2020-05-11 DIAGNOSIS — N13.8 BPH WITH OBSTRUCTION/LOWER URINARY TRACT SYMPTOMS: ICD-10-CM

## 2020-05-11 DIAGNOSIS — N40.1 BPH WITH OBSTRUCTION/LOWER URINARY TRACT SYMPTOMS: ICD-10-CM

## 2020-05-11 DIAGNOSIS — R39.89 SENSATION OF PRESSURE IN BLADDER AREA: Primary | ICD-10-CM

## 2020-05-11 DIAGNOSIS — R30.0 DYSURIA: ICD-10-CM

## 2020-05-11 DIAGNOSIS — R31.9 HEMATURIA, UNSPECIFIED TYPE: ICD-10-CM

## 2020-05-11 LAB — SARS-COV-2 RNA RESP QL NAA+PROBE: NOT DETECTED

## 2020-05-11 PROCEDURE — 88112 PR  CYTOPATH, CELL ENHANCE TECH: ICD-10-PCS | Mod: 26,HCNC,, | Performed by: PATHOLOGY

## 2020-05-11 PROCEDURE — 1159F PR MEDICATION LIST DOCUMENTED IN MEDICAL RECORD: ICD-10-PCS | Mod: HCNC,S$GLB,, | Performed by: PHYSICIAN ASSISTANT

## 2020-05-11 PROCEDURE — 51798 US URINE CAPACITY MEASURE: CPT | Mod: HCNC,S$GLB,, | Performed by: PHYSICIAN ASSISTANT

## 2020-05-11 PROCEDURE — 1101F PT FALLS ASSESS-DOCD LE1/YR: CPT | Mod: HCNC,CPTII,S$GLB, | Performed by: PHYSICIAN ASSISTANT

## 2020-05-11 PROCEDURE — 99214 OFFICE O/P EST MOD 30 MIN: CPT | Mod: HCNC,S$GLB,, | Performed by: PHYSICIAN ASSISTANT

## 2020-05-11 PROCEDURE — 99999 PR PBB SHADOW E&M-EST. PATIENT-LVL III: ICD-10-PCS | Mod: PBBFAC,HCNC,, | Performed by: PHYSICIAN ASSISTANT

## 2020-05-11 PROCEDURE — 1159F MED LIST DOCD IN RCRD: CPT | Mod: HCNC,S$GLB,, | Performed by: PHYSICIAN ASSISTANT

## 2020-05-11 PROCEDURE — 88112 CYTOPATH CELL ENHANCE TECH: CPT | Mod: 26,HCNC,, | Performed by: PATHOLOGY

## 2020-05-11 PROCEDURE — 99999 PR PBB SHADOW E&M-EST. PATIENT-LVL III: CPT | Mod: PBBFAC,HCNC,, | Performed by: PHYSICIAN ASSISTANT

## 2020-05-11 PROCEDURE — 1125F AMNT PAIN NOTED PAIN PRSNT: CPT | Mod: HCNC,S$GLB,, | Performed by: PHYSICIAN ASSISTANT

## 2020-05-11 PROCEDURE — 99214 PR OFFICE/OUTPT VISIT, EST, LEVL IV, 30-39 MIN: ICD-10-PCS | Mod: HCNC,S$GLB,, | Performed by: PHYSICIAN ASSISTANT

## 2020-05-11 PROCEDURE — 1101F PR PT FALLS ASSESS DOC 0-1 FALLS W/OUT INJ PAST YR: ICD-10-PCS | Mod: HCNC,CPTII,S$GLB, | Performed by: PHYSICIAN ASSISTANT

## 2020-05-11 PROCEDURE — 51798 PR MEAS,POST-VOID RES,US,NON-IMAGING: ICD-10-PCS | Mod: HCNC,S$GLB,, | Performed by: PHYSICIAN ASSISTANT

## 2020-05-11 PROCEDURE — 1125F PR PAIN SEVERITY QUANTIFIED, PAIN PRESENT: ICD-10-PCS | Mod: HCNC,S$GLB,, | Performed by: PHYSICIAN ASSISTANT

## 2020-05-11 PROCEDURE — 87086 URINE CULTURE/COLONY COUNT: CPT | Mod: HCNC

## 2020-05-11 PROCEDURE — 88112 CYTOPATH CELL ENHANCE TECH: CPT | Mod: HCNC | Performed by: PATHOLOGY

## 2020-05-11 RX ORDER — LIDOCAINE HYDROCHLORIDE 20 MG/ML
JELLY TOPICAL ONCE
Status: CANCELLED | OUTPATIENT
Start: 2020-05-11 | End: 2020-05-11

## 2020-05-11 RX ORDER — DOXYCYCLINE HYCLATE 100 MG
100 TABLET ORAL ONCE
Status: CANCELLED | OUTPATIENT
Start: 2020-05-11 | End: 2020-05-11

## 2020-05-11 NOTE — PROGRESS NOTES
This patient was evaluated during the COVID-19 pandemic. Work up/disposition may be affected by resource limitations and concerns regarding nosocomial infections.    CHIEF COMPLAINT:    Mr. Nunez is a 65 y.o. male presenting for bladder pressure.   PRESENTING ILLNESS:    Fletcher Nunez is a 65 y.o. male with a PMH of BPH,  who presents for bladder pressure    Patient was seen in urgent care yesterday for fever, chills, decrease urination and bladder pressure.  Urine culture was ordered.  Also has a repeat covid 19 test which is pending.   He had a urine culture 4/24/20 which was negative.      He presents today for bladder pressure x 3 weeks.  It is constant.  He does not get any relief after urination.  Tylenol helps.  He also reports dysuria.  He reports voiding a small amount at a time.  He is taking flomax.  Also reports that his urine has been darker and appear to have blood in it.  He denies perineum pressure, frequency, urgency, back pain.    He reports a low grade fever (highest recorded temperature has been 98.8) but has experienced night sweats and chills.  He reports having a negative covid 19 test at the end of last month.      Previous/Current Smoker: no  Radiation therapy to pelvis: no  Chemotherapy: no  Personal/ family history of bladder/ kidney/ prostate cancer: sister had nephrectomy due to kidney cancer.  Exposure to harmful chemicals: no    REVIEW OF SYSTEMS:    Constitutional: Negative for fever and chills.   HENT: Negative for hearing loss.   Eyes: Negative for visual disturbance.   Respiratory: Negative for shortness of breath.   Cardiovascular: Negative for chest pain.   Gastrointestinal: Negative for vomiting, and constipation.   Genitourinary: See HPI  Neurological: Negative for dizziness.   Hematological: Does not bruise/bleed easily.   Psychiatric/Behavioral: Negative for confusion.       PATIENT HISTORY:    Past Medical History:   Diagnosis Date    BPH (benign prostatic hyperplasia)      Positive PPD, treated     hx of childhood        Past Surgical History:   Procedure Laterality Date    CHOLECYSTECTOMY         Family History   Problem Relation Age of Onset    Heart disease Father     Diabetes Father     Heart disease Sister     Diabetes Sister     Hyperlipidemia Sister     Hypertension Sister     Cancer Maternal Grandmother         stomach cancer     Cancer Sister         renal cancer        Social History     Socioeconomic History    Marital status:      Spouse name: Not on file    Number of children: Not on file    Years of education: Not on file    Highest education level: Not on file   Occupational History    Not on file   Social Needs    Financial resource strain: Not on file    Food insecurity:     Worry: Not on file     Inability: Not on file    Transportation needs:     Medical: Not on file     Non-medical: Not on file   Tobacco Use    Smoking status: Never Smoker   Substance and Sexual Activity    Alcohol use: Not Currently    Drug use: Not on file    Sexual activity: Not on file   Lifestyle    Physical activity:     Days per week: Not on file     Minutes per session: Not on file    Stress: Not on file   Relationships    Social connections:     Talks on phone: Not on file     Gets together: Not on file     Attends [employer] service: Not on file     Active member of club or organization: Not on file     Attends meetings of clubs or organizations: Not on file     Relationship status: Not on file   Other Topics Concern    Not on file   Social History Narrative    Not on file       Allergies:  Patient has no known allergies.    Medications:    Current Outpatient Medications:     tamsulosin (FLOMAX) 0.4 mg Cap, TAKE 1 CAPSULE AT BEDTIME, Disp: 90 capsule, Rfl: 3    PHYSICAL EXAMINATION:    Constitutional: He appears well-developed and well-nourished.  He is in no apparent distress.    Eyes: No scleral icterus noted bilaterally. No discharge  bilaterally.    Nose: No rhinorrhea    Cardiovascular: Normal rate.     Pulmonary/Chest: Effort normal. No respiratory distress.     Abdominal:  He exhibits no distension.       Neurological: He is alert and oriented to person, place, and time.     Skin: Skin is warm and dry.     Psych: Cooperative with normal affect.    Genitourinary: Prostate is enlarged.  It is smooth, nontender, non boggy with no nodules noted.      Physical Exam    Bladder scan performed by Nurse Sharona.  PVR 31ml    LABS:    U/a: 1.010, pH 5, tr protien, tr blood otherwise negative.    Lab Results   Component Value Date    PSA 0.88 10/24/2012    PSA 0.88 05/26/2010    PSADIAG 1.1 10/01/2018    PSADIAG 1.1 10/17/2017    PSADIAG 0.82 09/29/2016    PSADIAG 0.82 10/02/2015    PSADIAG 0.93 10/01/2014    PSADIAG 0.98 10/07/2013       IMPRESSION:    Encounter Diagnoses   Name Primary?    Sensation of pressure in bladder area Yes    Hematuria, unspecified type     Dysuria     BPH with obstruction/lower urinary tract symptoms          PLAN:    Bladder scan was normal at 31ml.  Reassured patient that he is emptying well.    Urine culture was ordered yesterday but it does not show that it is processing.  I will repeat urine culture just in case his culture from yesterday was not processed.      Continue flomax.    I explained to the patient that the causes of hematuria, whether it be gross hematuria or microhematuria, are many including infections, stones,  malignancy.     Nevertheless, I explained to the patient that the evaluation in both cases consists of upper tract imaging followed by flexible cystoscopy. I described the rationale and procedure for both and answered all questions.    Hematuria work up discussed in detail.  Will proceed with hematuria work up:  Creatinine prior to CT urogram to ensure adequate kidney function.   Cysto  CT urogram   Urine cytology    Follow up for cysto.        I spent 25 minutes with the patient of which more  than half was spent in direct consultation with the patient in regards to our treatment and plan.      Re Barragan PA-C

## 2020-05-11 NOTE — TELEPHONE ENCOUNTER
----- Message from Ulises Fernandez sent at 5/11/2020  9:19 AM CDT -----  Contact: .cctimesensitive  Type:  Same Day Appointment Request    Caller is requesting a same day appointment.     Symptoms: The Pt states that he went to urgent care yesterday for pain in his lower groin area and is having trouble urinating.  The Pt states that they tested his urine and blood was found in it.  The Pt states that it's burning and he takes Tylenol but it's not working.    Best Call Back Number: [phone]

## 2020-05-12 ENCOUNTER — TELEPHONE (OUTPATIENT)
Dept: URGENT CARE | Facility: CLINIC | Age: 65
End: 2020-05-12

## 2020-05-12 NOTE — TELEPHONE ENCOUNTER
Second attempt to call patient regarding his negative COVID swab. VM left for patient to return call.

## 2020-05-13 LAB
BACTERIA UR CULT: NO GROWTH
BACTERIA UR CULT: NO GROWTH
BACTERIA UR CULT: NORMAL

## 2020-05-14 LAB — FINAL PATHOLOGIC DIAGNOSIS: NORMAL

## 2020-05-15 ENCOUNTER — TELEPHONE (OUTPATIENT)
Dept: UROLOGY | Facility: CLINIC | Age: 65
End: 2020-05-15

## 2020-05-15 NOTE — TELEPHONE ENCOUNTER
Patient states he does not want to schedule any test he is over what ever it was he had      ----- Message from Re Pearson sent at 5/15/2020 10:17 AM CDT -----  Contact: [phone]  Calling in regards to missed call to schedule test. Please call

## 2020-05-19 ENCOUNTER — TELEPHONE (OUTPATIENT)
Dept: UROLOGY | Facility: CLINIC | Age: 65
End: 2020-05-19

## 2020-05-19 NOTE — TELEPHONE ENCOUNTER
I was calling to see about scheduling test I informed patient that these test are of importance because it test to detect bladder cancer.   Patient states that he doesn't want to schedule anything because he will not be coming to [facility] any more of any of his medical needs.        ----- Message from Re Barragan PA-C sent at 5/18/2020  5:40 PM CDT -----  Please call pt to schedule ct and cysto.  Thanks.

## 2020-06-01 ENCOUNTER — HOSPITAL ENCOUNTER (OUTPATIENT)
Dept: RADIOLOGY | Facility: HOSPITAL | Age: 65
Discharge: HOME | End: 2020-06-01
Attending: PHYSICIAN ASSISTANT
Payer: MEDICARE

## 2020-06-01 DIAGNOSIS — R31.9 HEMATURIA, UNSPECIFIED TYPE: ICD-10-CM

## 2020-06-01 PROCEDURE — 25500020 PHARM REV CODE 255: Mod: HCNC | Performed by: PHYSICIAN ASSISTANT

## 2020-06-01 PROCEDURE — 74178 CT ABD&PLV WO CNTR FLWD CNTR: CPT | Mod: 26,HCNC,, | Performed by: RADIOLOGY

## 2020-06-01 PROCEDURE — 74178 CT ABD&PLV WO CNTR FLWD CNTR: CPT | Mod: TC,HCNC

## 2020-06-01 PROCEDURE — 74178 CT UROGRAM ABD PELVIS W WO: ICD-10-PCS | Mod: 26,HCNC,, | Performed by: RADIOLOGY

## 2020-06-01 RX ADMIN — IOHEXOL 125 ML: 350 INJECTION, SOLUTION INTRAVENOUS at 07:06

## 2020-06-02 ENCOUNTER — TELEPHONE (OUTPATIENT)
Dept: UROLOGY | Facility: CLINIC | Age: 65
End: 2020-06-02

## 2020-06-02 DIAGNOSIS — K57.92 DIVERTICULITIS: Primary | ICD-10-CM

## 2020-06-02 NOTE — TELEPHONE ENCOUNTER
Called to discuss CT scan results.  I left message for patient to check his [facility] results for detail message.  GI referral placed for diverticultis

## 2020-06-04 ENCOUNTER — TELEPHONE (OUTPATIENT)
Dept: UROLOGY | Facility: CLINIC | Age: 65
End: 2020-06-04

## 2020-06-04 NOTE — TELEPHONE ENCOUNTER
Discussed ct scan with pat.  Instructed him to keep cysto appt.  He has no fever or GI symptoms. He is scheduled to have a virtual visit with GI on Monday.

## 2020-06-08 ENCOUNTER — TELEPHONE (OUTPATIENT)
Dept: GASTROENTEROLOGY | Facility: CLINIC | Age: 65
End: 2020-06-08

## 2020-06-08 ENCOUNTER — OFFICE VISIT (OUTPATIENT)
Dept: GASTROENTEROLOGY | Facility: CLINIC | Age: 65
End: 2020-06-08
Payer: MEDICARE

## 2020-06-08 ENCOUNTER — PROCEDURE VISIT (OUTPATIENT)
Dept: UROLOGY | Facility: CLINIC | Age: 65
End: 2020-06-08
Payer: MEDICARE

## 2020-06-08 ENCOUNTER — LAB VISIT (OUTPATIENT)
Dept: LAB | Facility: HOSPITAL | Age: 65
End: 2020-06-08
Attending: PHYSICIAN ASSISTANT
Payer: MEDICARE

## 2020-06-08 VITALS
RESPIRATION RATE: 17 BRPM | TEMPERATURE: 99 F | SYSTOLIC BLOOD PRESSURE: 120 MMHG | DIASTOLIC BLOOD PRESSURE: 72 MMHG | HEART RATE: 99 BPM | BODY MASS INDEX: 22.38 KG/M2 | HEIGHT: 70 IN | WEIGHT: 156.31 LBS

## 2020-06-08 DIAGNOSIS — R10.32 LEFT LOWER QUADRANT ABDOMINAL PAIN: ICD-10-CM

## 2020-06-08 DIAGNOSIS — R31.9 HEMATURIA, UNSPECIFIED TYPE: ICD-10-CM

## 2020-06-08 DIAGNOSIS — K57.92 DIVERTICULITIS: Primary | ICD-10-CM

## 2020-06-08 DIAGNOSIS — K57.92 DIVERTICULITIS: ICD-10-CM

## 2020-06-08 LAB
ALBUMIN SERPL BCP-MCNC: 3.6 G/DL (ref 3.5–5.2)
ALP SERPL-CCNC: 114 U/L (ref 55–135)
ALT SERPL W/O P-5'-P-CCNC: 42 U/L (ref 10–44)
ANION GAP SERPL CALC-SCNC: 9 MMOL/L (ref 8–16)
AST SERPL-CCNC: 29 U/L (ref 10–40)
BASOPHILS # BLD AUTO: 0.04 K/UL (ref 0–0.2)
BASOPHILS NFR BLD: 0.5 % (ref 0–1.9)
BILIRUB SERPL-MCNC: 0.7 MG/DL (ref 0.1–1)
BUN SERPL-MCNC: 14 MG/DL (ref 8–23)
CALCIUM SERPL-MCNC: 9.8 MG/DL (ref 8.7–10.5)
CHLORIDE SERPL-SCNC: 103 MMOL/L (ref 95–110)
CO2 SERPL-SCNC: 26 MMOL/L (ref 23–29)
CREAT SERPL-MCNC: 1.3 MG/DL (ref 0.5–1.4)
CRP SERPL-MCNC: 26.4 MG/L (ref 0–8.2)
DIFFERENTIAL METHOD: ABNORMAL
EOSINOPHIL # BLD AUTO: 0 K/UL (ref 0–0.5)
EOSINOPHIL NFR BLD: 0.2 % (ref 0–8)
ERYTHROCYTE [DISTWIDTH] IN BLOOD BY AUTOMATED COUNT: 12.4 % (ref 11.5–14.5)
EST. GFR  (AFRICAN AMERICAN): >60 ML/MIN/1.73 M^2
EST. GFR  (NON AFRICAN AMERICAN): 56.5 ML/MIN/1.73 M^2
GLUCOSE SERPL-MCNC: 93 MG/DL (ref 70–110)
HCT VFR BLD AUTO: 46.5 % (ref 40–54)
HGB BLD-MCNC: 14.8 G/DL (ref 14–18)
IMM GRANULOCYTES # BLD AUTO: 0.04 K/UL (ref 0–0.04)
IMM GRANULOCYTES NFR BLD AUTO: 0.5 % (ref 0–0.5)
LYMPHOCYTES # BLD AUTO: 0.9 K/UL (ref 1–4.8)
LYMPHOCYTES NFR BLD: 11.5 % (ref 18–48)
MCH RBC QN AUTO: 28.8 PG (ref 27–31)
MCHC RBC AUTO-ENTMCNC: 31.8 G/DL (ref 32–36)
MCV RBC AUTO: 91 FL (ref 82–98)
MONOCYTES # BLD AUTO: 0.7 K/UL (ref 0.3–1)
MONOCYTES NFR BLD: 8.1 % (ref 4–15)
NEUTROPHILS # BLD AUTO: 6.5 K/UL (ref 1.8–7.7)
NEUTROPHILS NFR BLD: 79.2 % (ref 38–73)
NRBC BLD-RTO: 0 /100 WBC
PLATELET # BLD AUTO: 234 K/UL (ref 150–350)
PMV BLD AUTO: 10 FL (ref 9.2–12.9)
POTASSIUM SERPL-SCNC: 4.1 MMOL/L (ref 3.5–5.1)
PROT SERPL-MCNC: 7.4 G/DL (ref 6–8.4)
RBC # BLD AUTO: 5.14 M/UL (ref 4.6–6.2)
SODIUM SERPL-SCNC: 138 MMOL/L (ref 136–145)
WBC # BLD AUTO: 8.17 K/UL (ref 3.9–12.7)

## 2020-06-08 PROCEDURE — 52000 CYSTOURETHROSCOPY: CPT | Mod: HCNC,S$GLB,, | Performed by: UROLOGY

## 2020-06-08 PROCEDURE — 85025 COMPLETE CBC W/AUTO DIFF WBC: CPT | Mod: HCNC

## 2020-06-08 PROCEDURE — 86140 C-REACTIVE PROTEIN: CPT | Mod: HCNC

## 2020-06-08 PROCEDURE — 1101F PT FALLS ASSESS-DOCD LE1/YR: CPT | Mod: HCNC,CPTII,95, | Performed by: NURSE PRACTITIONER

## 2020-06-08 PROCEDURE — 1101F PR PT FALLS ASSESS DOC 0-1 FALLS W/OUT INJ PAST YR: ICD-10-PCS | Mod: HCNC,CPTII,95, | Performed by: NURSE PRACTITIONER

## 2020-06-08 PROCEDURE — 1125F AMNT PAIN NOTED PAIN PRSNT: CPT | Mod: HCNC,95,, | Performed by: NURSE PRACTITIONER

## 2020-06-08 PROCEDURE — 1159F PR MEDICATION LIST DOCUMENTED IN MEDICAL RECORD: ICD-10-PCS | Mod: HCNC,95,, | Performed by: NURSE PRACTITIONER

## 2020-06-08 PROCEDURE — 99203 PR OFFICE/OUTPT VISIT, NEW, LEVL III, 30-44 MIN: ICD-10-PCS | Mod: HCNC,95,, | Performed by: NURSE PRACTITIONER

## 2020-06-08 PROCEDURE — 1159F MED LIST DOCD IN RCRD: CPT | Mod: HCNC,95,, | Performed by: NURSE PRACTITIONER

## 2020-06-08 PROCEDURE — 80053 COMPREHEN METABOLIC PANEL: CPT | Mod: HCNC

## 2020-06-08 PROCEDURE — 52000 PR CYSTOURETHROSCOPY: ICD-10-PCS | Mod: HCNC,S$GLB,, | Performed by: UROLOGY

## 2020-06-08 PROCEDURE — 36415 COLL VENOUS BLD VENIPUNCTURE: CPT | Mod: HCNC,PO

## 2020-06-08 PROCEDURE — 1125F PR PAIN SEVERITY QUANTIFIED, PAIN PRESENT: ICD-10-PCS | Mod: HCNC,95,, | Performed by: NURSE PRACTITIONER

## 2020-06-08 PROCEDURE — 99203 OFFICE O/P NEW LOW 30 MIN: CPT | Mod: HCNC,95,, | Performed by: NURSE PRACTITIONER

## 2020-06-08 RX ORDER — METRONIDAZOLE 500 MG/1
500 TABLET ORAL EVERY 8 HOURS
Qty: 21 TABLET | Refills: 0 | Status: SHIPPED | OUTPATIENT
Start: 2020-06-08 | End: 2020-06-15

## 2020-06-08 RX ORDER — DOXYCYCLINE HYCLATE 100 MG
100 TABLET ORAL ONCE
Status: COMPLETED | OUTPATIENT
Start: 2020-06-08 | End: 2020-06-08

## 2020-06-08 RX ORDER — CIPROFLOXACIN 500 MG/1
500 TABLET ORAL 2 TIMES DAILY
Qty: 14 TABLET | Refills: 0 | Status: SHIPPED | OUTPATIENT
Start: 2020-06-08 | End: 2020-06-15

## 2020-06-08 RX ORDER — LIDOCAINE HYDROCHLORIDE 20 MG/ML
JELLY TOPICAL ONCE
Status: COMPLETED | OUTPATIENT
Start: 2020-06-08 | End: 2020-06-08

## 2020-06-08 RX ADMIN — Medication 100 MG: at 01:06

## 2020-06-08 RX ADMIN — LIDOCAINE HYDROCHLORIDE: 20 JELLY TOPICAL at 12:06

## 2020-06-08 NOTE — TELEPHONE ENCOUNTER
MA LVM for pt  , and told pt labs was scheduled for next available in the system 3:55 , but he can still show up for his lab appt at 3:30 .

## 2020-06-08 NOTE — PROCEDURES
Procedures         Procedures: Flexible cystourethroscopy      Pre Procedure Diagnosis: hematuria    Post Procedure Diagnosis: hematuria, bph    Surgeon: Barrington Hurtado MD    Anesthesia: 2% uro-jet lidocaine jelly for local analgesia     Flexible cysto-urethroscopy was performed after consent was obtained.  The risks and benefits were explained.    2% lidocaine urojet was used for local analgesia.  The genitalia was prepped and draped in the sterile fashion.  The flexible scope was advanced into the urethra and into the bladder.  Bilateral ureteral orifice were evaluated and noted to be normal with clear efflux.  The bladder was completely surveyed in a systematic fashion.   No bladder tumors or lesions were seen.  No strictures were noted.  The prostate showed Significant hypertrophy.  There was Mild median lobe present. Grade 3 trabeculations    The patient tolerated the procedure well without complication.    They will follow up in 6 month(s) for ua     Add proscar prn  Pt happy on flomax

## 2020-06-08 NOTE — PATIENT INSTRUCTIONS
What to Expect After a Cystoscopy  For the next 24-48 hours, you may feel a mild burning when you urinate. This burning is normal and expected. Drink plenty of water to dilute the urine to help relieve the burning sensation. You may also see a small amount of blood in your urine after the procedure.    Unless you are already taking antibiotics, you may be given an antibiotic after the test to prevent infection.    Signs and Symptoms to Report  Call the [facility] at [phone] if you develop any of the following:  · Fever of 101 degrees or higher  · Chills or persistent bleeding  · Inability to urinate

## 2020-06-08 NOTE — LETTER
June 8, 2020      Re Barragan PA-C  [address] [address]           [facility]  [address]  [place] [address]  Phone: [phone]  Fax: [phone]          Patient: Fletcher Nunez   MR Number: [id number]   YOB: 1955   Date of Visit: 6/8/2020       Dear Re Barragan:    Thank you for referring Fletcher Nunez to me for evaluation. Attached you will find relevant portions of my assessment and plan of care.    If you have questions, please do not hesitate to call me. I look forward to following Fletcher Nunez along with you.    Sincerely,    Zoya Larsen, ANSELMO    Enclosure  CC:  No Recipients    If you would like to receive this communication electronically, please contact [email or web address] or [phone] to request more information on [email or web address] Link access.    For providers and/or their staff who would like to refer a patient to [facility], please contact us through our one-stop-shop provider referral line, [facility], at [phone].    If you feel you have received this communication in error or would no longer like to receive these types of communications, please e-mail [email or web address]

## 2020-06-08 NOTE — PROGRESS NOTES
[facility] Consultation Note    Reason for Consult:  The primary encounter diagnosis was Diverticulitis. A diagnosis of Left lower quadrant abdominal pain was also pertinent to this visit.    PCP:   Stephania Rust   [address] [facility] [place] [address]    Referring MD:  Re Barragan Pa-c  [address] [place] [address]    Initial History of Present Illness (HPI):  The patient location is:  Patient Home   The chief complaint leading to consultation is: diverticulitis  Visit type: Virtual visit with synchronous audio and video  Total time spent with patient: 15 mins  Each patient to whom he or she provides medical services by telemedicine is:  (1) informed of the relationship between the physician and patient and the respective role of any other health care provider with respect to management of the patient; and (2) notified that he or she may decline to receive medical services by telemedicine and may withdraw from such care at any time.      This is a 65 y.o. male here for evaluation of diverticulitis. He is a new patient. Pt had a CT urogram for hematuria and dysuria.  LLQ pain - present about a month, was very bad Early may and had a fever. It was a bilateral lower abd pain initially. Today only rated in the LLQ as a 1/10.  No n/v.  Fever  - resolved after two weeks.   Change in bowel habits- none  No overt GI bleeding  He has never had diverticulitis  Had a cystoscope today that he reports is normal. He was having hematuria.    Answers for HPI/ROS submitted by the patient on 6/5/2020   fever: No  chills: No  weight loss: No  eye pain: No  eye redness: No  trouble swallowing: No  chest pain: No  shortness of breath: No  cough: Yes  rash: No  abdominal pain: Yes  nausea: No  vomiting: No  Feeling depressed?: No  nervous/ anxious: No  heartburn: No  Joint pain? : No  hematuria: No  back pain: No      Medical History:  has a past medical history of BPH (benign  prostatic hyperplasia) and Positive PPD, treated.    Surgical History:  has a past surgical history that includes Cholecystectomy.    Family History: family history includes Cancer in his maternal grandmother and sister; Diabetes in his father and sister; Heart disease in his father and sister; Hyperlipidemia in his sister; Hypertension in his sister..     Social History:  reports that he has never smoked. He does not have any smokeless tobacco history on file. He reports that he drank alcohol.    Review of patient's allergies indicates:  No Known Allergies    Medication List with Changes/Refills   New Medications    CIPROFLOXACIN HCL (CIPRO) 500 MG TABLET    Take 1 tablet (500 mg total) by mouth 2 (two) times daily. for 7 days    METRONIDAZOLE (FLAGYL) 500 MG TABLET    Take 1 tablet (500 mg total) by mouth every 8 (eight) hours. Take with food, avoid alcohol. for 7 days   Current Medications    ASPIRIN (ASPIR-81 ORAL)    Take by mouth.    GARLIC ORAL    Take by mouth.    TAMSULOSIN (FLOMAX) 0.4 MG CAP    TAKE 1 CAPSULE AT BEDTIME         Objective Findings:    Vital Signs:  There were no vitals taken for this visit.  There is no height or weight on file to calculate BMI.    Physical Exam:  General Appearance: Well appearing, NAD noted  Neurologic:  Alert and oriented x4  Psychiatric:  Normal speech mentation and affect    Labs reviewed:  Lab Results   Component Value Date    WBC 3.82 (L) 03/23/2013    HGB 16.1 03/23/2013    HCT 46.0 03/23/2013     03/23/2013    CHOL 162 03/23/2013    TRIG 70 03/23/2013    HDL 37 (L) 03/23/2013    ALT 24 03/23/2013    AST 22 03/23/2013     03/23/2013    K 3.9 03/23/2013     03/23/2013    CREATININE 1.1 05/11/2020    BUN 16 03/23/2013    CO2 26 03/23/2013    TSH 1.679 03/23/2013    PSA 0.88 10/24/2012           Imaging reviewed:  CT urogram 6/1/2020 showed uncomplicated diverticulitis.     Endoscopy reviewed:    None to review. He sees Dr. Stover with  A.    Medical Decision Making:    Assessment:    Fletcher Nunez is a 65 y.o. male here for:    1. Diverticulitis    2. Left lower quadrant abdominal pain       First occurrence. Will treat with abx and get labs to trend CRP if necessary.    Recommendations:  1. Labs  2. Cipro Flagyl  3. Pt will message me after abx completion with update on sx.  4. He already has a colonoscopy scheduled in the next couple of mos with Jolanta. I did discuss with pt why this is important after diverticulitis is seen on CT.  5. High fiber diet discussed    F/u pending abx completion . Pt will message me w/ symptom update    Order summary:  Orders Placed This Encounter    C-reactive protein    CBC auto differential    Comprehensive metabolic panel    ciprofloxacin HCl (CIPRO) 500 MG tablet    metroNIDAZOLE (FLAGYL) 500 MG tablet         Thank you for allowing me to participate in the care of Fletcher Nunez    Zoya Larsen, JUSTINAP-C

## 2020-06-08 NOTE — TELEPHONE ENCOUNTER
MA contacted pt and went over all rooming questions with pt for pt virtual visit . Pt verbalized all understanding.

## 2020-06-09 ENCOUNTER — TELEPHONE (OUTPATIENT)
Dept: GASTROENTEROLOGY | Facility: CLINIC | Age: 65
End: 2020-06-09

## 2020-06-09 NOTE — TELEPHONE ENCOUNTER
Reviewed lab results and recommendations with patient.  Pt verbalized understanding to all and has no further questions at this time.    ----- Message from Zoya Larsen NP sent at 6/9/2020  8:12 AM CDT -----  Labs show there is inflammation. We will monitor for now and see how he responds to the abx

## 2020-06-09 NOTE — TELEPHONE ENCOUNTER
----- Message from Zoya Larsen NP sent at 6/9/2020  8:12 AM CDT -----  Labs show there is inflammation. We will monitor for now and see how he responds to the abx

## 2020-12-07 ENCOUNTER — LAB VISIT (OUTPATIENT)
Dept: LAB | Facility: HOSPITAL | Age: 65
End: 2020-12-07
Payer: MEDICARE

## 2020-12-07 ENCOUNTER — OFFICE VISIT (OUTPATIENT)
Dept: UROLOGY | Facility: CLINIC | Age: 65
End: 2020-12-07
Payer: MEDICARE

## 2020-12-07 VITALS
WEIGHT: 165.38 LBS | HEIGHT: 70 IN | SYSTOLIC BLOOD PRESSURE: 121 MMHG | HEART RATE: 93 BPM | BODY MASS INDEX: 23.68 KG/M2 | DIASTOLIC BLOOD PRESSURE: 80 MMHG

## 2020-12-07 DIAGNOSIS — N40.1 BPH WITH URINARY OBSTRUCTION: ICD-10-CM

## 2020-12-07 DIAGNOSIS — R31.29 MICROSCOPIC HEMATURIA: ICD-10-CM

## 2020-12-07 DIAGNOSIS — N40.1 BPH WITH URINARY OBSTRUCTION: Primary | ICD-10-CM

## 2020-12-07 DIAGNOSIS — N13.8 BPH WITH URINARY OBSTRUCTION: ICD-10-CM

## 2020-12-07 DIAGNOSIS — N13.8 BPH WITH URINARY OBSTRUCTION: Primary | ICD-10-CM

## 2020-12-07 LAB — COMPLEXED PSA SERPL-MCNC: 1.1 NG/ML (ref 0–4)

## 2020-12-07 PROCEDURE — 3288F PR FALLS RISK ASSESSMENT DOCUMENTED: ICD-10-PCS | Mod: CPTII,S$GLB,, | Performed by: NURSE PRACTITIONER

## 2020-12-07 PROCEDURE — 1101F PR PT FALLS ASSESS DOC 0-1 FALLS W/OUT INJ PAST YR: ICD-10-PCS | Mod: CPTII,S$GLB,, | Performed by: NURSE PRACTITIONER

## 2020-12-07 PROCEDURE — 99999 PR PBB SHADOW E&M-EST. PATIENT-LVL III: CPT | Mod: PBBFAC,,, | Performed by: NURSE PRACTITIONER

## 2020-12-07 PROCEDURE — 99214 OFFICE O/P EST MOD 30 MIN: CPT | Mod: S$GLB,,, | Performed by: NURSE PRACTITIONER

## 2020-12-07 PROCEDURE — 1126F PR PAIN SEVERITY QUANTIFIED, NO PAIN PRESENT: ICD-10-PCS | Mod: S$GLB,,, | Performed by: NURSE PRACTITIONER

## 2020-12-07 PROCEDURE — 99999 PR PBB SHADOW E&M-EST. PATIENT-LVL III: ICD-10-PCS | Mod: PBBFAC,,, | Performed by: NURSE PRACTITIONER

## 2020-12-07 PROCEDURE — 1101F PT FALLS ASSESS-DOCD LE1/YR: CPT | Mod: CPTII,S$GLB,, | Performed by: NURSE PRACTITIONER

## 2020-12-07 PROCEDURE — 1159F PR MEDICATION LIST DOCUMENTED IN MEDICAL RECORD: ICD-10-PCS | Mod: S$GLB,,, | Performed by: NURSE PRACTITIONER

## 2020-12-07 PROCEDURE — 1159F MED LIST DOCD IN RCRD: CPT | Mod: S$GLB,,, | Performed by: NURSE PRACTITIONER

## 2020-12-07 PROCEDURE — 36415 COLL VENOUS BLD VENIPUNCTURE: CPT

## 2020-12-07 PROCEDURE — 3288F FALL RISK ASSESSMENT DOCD: CPT | Mod: CPTII,S$GLB,, | Performed by: NURSE PRACTITIONER

## 2020-12-07 PROCEDURE — 3008F PR BODY MASS INDEX (BMI) DOCUMENTED: ICD-10-PCS | Mod: CPTII,S$GLB,, | Performed by: NURSE PRACTITIONER

## 2020-12-07 PROCEDURE — 3008F BODY MASS INDEX DOCD: CPT | Mod: CPTII,S$GLB,, | Performed by: NURSE PRACTITIONER

## 2020-12-07 PROCEDURE — 99214 PR OFFICE/OUTPT VISIT, EST, LEVL IV, 30-39 MIN: ICD-10-PCS | Mod: S$GLB,,, | Performed by: NURSE PRACTITIONER

## 2020-12-07 PROCEDURE — 84153 ASSAY OF PSA TOTAL: CPT

## 2020-12-07 PROCEDURE — 1126F AMNT PAIN NOTED NONE PRSNT: CPT | Mod: S$GLB,,, | Performed by: NURSE PRACTITIONER

## 2020-12-07 RX ORDER — TAMSULOSIN HYDROCHLORIDE 0.4 MG/1
1 CAPSULE ORAL NIGHTLY
Qty: 90 CAPSULE | Refills: 3 | Status: SHIPPED | OUTPATIENT
Start: 2020-12-07 | End: 2021-12-01 | Stop reason: SDUPTHER

## 2020-12-07 NOTE — PROGRESS NOTES
CHIEF COMPLAINT:    Fletcher Nunez is a 65 y.o. male presents today for f/u visit.    HISTORY OF PRESENTING ILLINESS:    Fletcher Nunez is a 65 y.o. male with a history of BPH and hematuria.  He takes flomax for his BPH and had (-) hematuria workup 06/08/2020.  Last clinic visit was 05/11/2020.     Here today for 6 month f/u visit.   He denies any urinary complaints.   Denies any issues with ED.       REVIEW OF SYSTEMS:  Review of Systems   Constitutional: Negative.  Negative for chills, diaphoresis and fever.   HENT: Negative for congestion and sore throat.    Eyes: Negative.  Negative for double vision.   Respiratory: Negative.  Negative for cough, shortness of breath and wheezing.    Cardiovascular: Negative.  Negative for chest pain, palpitations and leg swelling.   Gastrointestinal: Negative.  Negative for abdominal pain, blood in stool, constipation, diarrhea, nausea and vomiting.        [facility]  for diverticulitis.    Genitourinary: Negative.  Negative for dysuria, flank pain and hematuria.        Ok with urination  Nocturia x 1     Musculoskeletal: Negative.  Negative for back pain, falls and neck pain.   Skin: Negative.  Negative for rash.   Neurological: Negative.  Negative for dizziness and seizures.   Endo/Heme/Allergies: Does not bruise/bleed easily.   Psychiatric/Behavioral: Negative.  Negative for depression. The patient is not nervous/anxious.          PATIENT HISTORY:    Past Medical History:   Diagnosis Date    BPH (benign prostatic hyperplasia)     Positive PPD, treated     hx of childhood        Past Surgical History:   Procedure Laterality Date    CHOLECYSTECTOMY         Family History   Problem Relation Age of Onset    Heart disease Father     Diabetes Father     Heart disease Sister     Diabetes Sister     Hyperlipidemia Sister     Hypertension Sister     Cancer Maternal Grandmother         stomach cancer     Cancer Sister         renal cancer        Social History      Socioeconomic History    Marital status:      Spouse name: Not on file    Number of children: Not on file    Years of education: Not on file    Highest education level: Not on file   Occupational History    Not on file   Social Needs    Financial resource strain: Not on file    Food insecurity     Worry: Not on file     Inability: Not on file    Transportation needs     Medical: Not on file     Non-medical: Not on file   Tobacco Use    Smoking status: Never Smoker   Substance and Sexual Activity    Alcohol use: Not Currently    Drug use: Not on file    Sexual activity: Not on file   Lifestyle    Physical activity     Days per week: Not on file     Minutes per session: Not on file    Stress: Not on file   Relationships    Social connections     Talks on phone: Not on file     Gets together: Not on file     Attends Voodoo service: Not on file     Active member of club or organization: Not on file     Attends meetings of clubs or organizations: Not on file     Relationship status: Not on file   Other Topics Concern    Not on file   Social History Narrative    Not on file       Allergies:  Patient has no known allergies.    Medications:    Current Outpatient Medications:     aspirin (ASPIR-81 ORAL), Take by mouth., Disp: , Rfl:     GARLIC ORAL, Take by mouth., Disp: , Rfl:     tamsulosin (FLOMAX) 0.4 mg Cap, Take 1 capsule (0.4 mg total) by mouth nightly., Disp: 90 capsule, Rfl: 3    PHYSICAL EXAMINATION:  Physical Exam  Vitals signs and nursing note reviewed.   Constitutional:       General: He is awake.      Appearance: Normal appearance. He is normal weight.   HENT:      Head: Normocephalic.      Right Ear: External ear normal.      Left Ear: External ear normal.      Nose: Nose normal.   Eyes:      Conjunctiva/sclera: Conjunctivae normal.   Neck:      Musculoskeletal: Normal range of motion.   Cardiovascular:      Rate and Rhythm: Normal rate.   Pulmonary:      Effort: Pulmonary  effort is normal. No respiratory distress.   Abdominal:      General: There is no distension.      Tenderness: There is no abdominal tenderness. There is no right CVA tenderness, left CVA tenderness or rebound.   Genitourinary:     Penis: Normal and circumcised.       Scrotum/Testes: Normal.         Right: Tenderness or swelling not present.      Prostate: Enlarged. Not tender and no nodules present.      Rectum: Normal.      Comments: Prostate is ~40gms without nodules.     Musculoskeletal: Normal range of motion.   Skin:     General: Skin is warm and dry.   Neurological:      General: No focal deficit present.      Mental Status: He is alert and oriented to person, place, and time.   Psychiatric:         Mood and Affect: Mood normal.         Behavior: Behavior is cooperative.           LABS:      In office UA today was clear of infection and no blood.     Lab Results   Component Value Date    PSA 0.88 10/24/2012    PSA 0.88 05/26/2010    PSADIAG 1.1 10/01/2018    PSADIAG 1.1 10/17/2017    PSADIAG 0.82 09/29/2016             IMPRESSION:    Encounter Diagnoses   Name Primary?    BPH with urinary obstruction Yes    Microscopic hematuria          Assessment:       1. BPH with urinary obstruction    2. Microscopic hematuria        Plan:         I spent 25 minutes with the patient of which more than half was spent in direct consultation with the patient in regards to our treatment and plan.    Education and recommendations of today's plan of care including home remedies.   we discussed his LUTS and contributory factors.  Discussed his past hematuria work up.  Urine today was clear of infection and blood.  Send for cytology.   Update PSA.   Refilled his Flomax.  RTC one year.

## 2021-12-01 ENCOUNTER — OFFICE VISIT (OUTPATIENT)
Dept: UROLOGY | Facility: CLINIC | Age: 66
End: 2021-12-01
Payer: MEDICARE

## 2021-12-01 ENCOUNTER — LAB VISIT (OUTPATIENT)
Dept: LAB | Facility: HOSPITAL | Age: 66
End: 2021-12-01
Payer: MEDICARE

## 2021-12-01 VITALS
DIASTOLIC BLOOD PRESSURE: 85 MMHG | HEIGHT: 70 IN | WEIGHT: 165.44 LBS | BODY MASS INDEX: 23.68 KG/M2 | HEART RATE: 90 BPM | SYSTOLIC BLOOD PRESSURE: 128 MMHG

## 2021-12-01 DIAGNOSIS — N13.8 BPH WITH URINARY OBSTRUCTION: ICD-10-CM

## 2021-12-01 DIAGNOSIS — N40.1 BPH WITH URINARY OBSTRUCTION: Primary | ICD-10-CM

## 2021-12-01 DIAGNOSIS — N40.1 BPH WITH URINARY OBSTRUCTION: ICD-10-CM

## 2021-12-01 DIAGNOSIS — N13.8 BPH WITH URINARY OBSTRUCTION: Primary | ICD-10-CM

## 2021-12-01 LAB
COMPLEXED PSA SERPL-MCNC: 1.5 NG/ML (ref 0–4)
CREAT SERPL-MCNC: 1 MG/DL (ref 0.5–1.4)
EST. GFR  (AFRICAN AMERICAN): >60 ML/MIN/1.73 M^2
EST. GFR  (NON AFRICAN AMERICAN): >60 ML/MIN/1.73 M^2

## 2021-12-01 PROCEDURE — 82565 ASSAY OF CREATININE: CPT | Performed by: NURSE PRACTITIONER

## 2021-12-01 PROCEDURE — 36415 COLL VENOUS BLD VENIPUNCTURE: CPT | Performed by: NURSE PRACTITIONER

## 2021-12-01 PROCEDURE — 99214 OFFICE O/P EST MOD 30 MIN: CPT | Mod: S$GLB,,, | Performed by: NURSE PRACTITIONER

## 2021-12-01 PROCEDURE — 99999 PR PBB SHADOW E&M-EST. PATIENT-LVL III: ICD-10-PCS | Mod: PBBFAC,,, | Performed by: NURSE PRACTITIONER

## 2021-12-01 PROCEDURE — 99999 PR PBB SHADOW E&M-EST. PATIENT-LVL III: CPT | Mod: PBBFAC,,, | Performed by: NURSE PRACTITIONER

## 2021-12-01 PROCEDURE — 99214 PR OFFICE/OUTPT VISIT, EST, LEVL IV, 30-39 MIN: ICD-10-PCS | Mod: S$GLB,,, | Performed by: NURSE PRACTITIONER

## 2021-12-01 PROCEDURE — 84153 ASSAY OF PSA TOTAL: CPT | Performed by: NURSE PRACTITIONER

## 2021-12-01 RX ORDER — TAMSULOSIN HYDROCHLORIDE 0.4 MG/1
1 CAPSULE ORAL NIGHTLY
Qty: 90 CAPSULE | Refills: 3 | Status: SHIPPED | OUTPATIENT
Start: 2021-12-01 | End: 2022-12-01

## 2022-02-04 ENCOUNTER — OFFICE VISIT (OUTPATIENT)
Dept: URGENT CARE | Facility: CLINIC | Age: 67
End: 2022-02-04
Payer: MEDICARE

## 2022-02-04 VITALS
WEIGHT: 165 LBS | RESPIRATION RATE: 16 BRPM | SYSTOLIC BLOOD PRESSURE: 126 MMHG | HEART RATE: 84 BPM | DIASTOLIC BLOOD PRESSURE: 84 MMHG | HEIGHT: 70 IN | BODY MASS INDEX: 23.62 KG/M2 | OXYGEN SATURATION: 97 % | TEMPERATURE: 99 F

## 2022-02-04 DIAGNOSIS — U07.1 COVID-19: ICD-10-CM

## 2022-02-04 DIAGNOSIS — J40 BRONCHITIS: ICD-10-CM

## 2022-02-04 DIAGNOSIS — R09.81 CONGESTION OF NASAL SINUS: Primary | ICD-10-CM

## 2022-02-04 DIAGNOSIS — R05.9 COUGH: ICD-10-CM

## 2022-02-04 DIAGNOSIS — U07.1 COVID-19 VIRUS DETECTED: ICD-10-CM

## 2022-02-04 LAB
CTP QC/QA: YES
SARS-COV-2 RDRP RESP QL NAA+PROBE: POSITIVE

## 2022-02-04 PROCEDURE — 3074F SYST BP LT 130 MM HG: CPT | Mod: CPTII,S$GLB,, | Performed by: PHYSICIAN ASSISTANT

## 2022-02-04 PROCEDURE — 3008F PR BODY MASS INDEX (BMI) DOCUMENTED: ICD-10-PCS | Mod: CPTII,S$GLB,, | Performed by: PHYSICIAN ASSISTANT

## 2022-02-04 PROCEDURE — U0002 COVID-19 LAB TEST NON-CDC: HCPCS | Mod: QW,S$GLB,, | Performed by: PHYSICIAN ASSISTANT

## 2022-02-04 PROCEDURE — 99213 PR OFFICE/OUTPT VISIT, EST, LEVL III, 20-29 MIN: ICD-10-PCS | Mod: S$GLB,,, | Performed by: PHYSICIAN ASSISTANT

## 2022-02-04 PROCEDURE — 3079F PR MOST RECENT DIASTOLIC BLOOD PRESSURE 80-89 MM HG: ICD-10-PCS | Mod: CPTII,S$GLB,, | Performed by: PHYSICIAN ASSISTANT

## 2022-02-04 PROCEDURE — 3074F PR MOST RECENT SYSTOLIC BLOOD PRESSURE < 130 MM HG: ICD-10-PCS | Mod: CPTII,S$GLB,, | Performed by: PHYSICIAN ASSISTANT

## 2022-02-04 PROCEDURE — U0002: ICD-10-PCS | Mod: QW,S$GLB,, | Performed by: PHYSICIAN ASSISTANT

## 2022-02-04 PROCEDURE — 3079F DIAST BP 80-89 MM HG: CPT | Mod: CPTII,S$GLB,, | Performed by: PHYSICIAN ASSISTANT

## 2022-02-04 PROCEDURE — 99213 OFFICE O/P EST LOW 20 MIN: CPT | Mod: S$GLB,,, | Performed by: PHYSICIAN ASSISTANT

## 2022-02-04 PROCEDURE — 1159F PR MEDICATION LIST DOCUMENTED IN MEDICAL RECORD: ICD-10-PCS | Mod: CPTII,S$GLB,, | Performed by: PHYSICIAN ASSISTANT

## 2022-02-04 PROCEDURE — 1160F PR REVIEW ALL MEDS BY PRESCRIBER/CLIN PHARMACIST DOCUMENTED: ICD-10-PCS | Mod: CPTII,S$GLB,, | Performed by: PHYSICIAN ASSISTANT

## 2022-02-04 PROCEDURE — 1160F RVW MEDS BY RX/DR IN RCRD: CPT | Mod: CPTII,S$GLB,, | Performed by: PHYSICIAN ASSISTANT

## 2022-02-04 PROCEDURE — 1159F MED LIST DOCD IN RCRD: CPT | Mod: CPTII,S$GLB,, | Performed by: PHYSICIAN ASSISTANT

## 2022-02-04 PROCEDURE — 3008F BODY MASS INDEX DOCD: CPT | Mod: CPTII,S$GLB,, | Performed by: PHYSICIAN ASSISTANT

## 2022-02-04 RX ORDER — ALBUTEROL SULFATE 90 UG/1
2 AEROSOL, METERED RESPIRATORY (INHALATION) EVERY 6 HOURS PRN
Qty: 18 G | Refills: 0 | Status: SHIPPED | OUTPATIENT
Start: 2022-02-04

## 2022-02-04 RX ORDER — FLUTICASONE PROPIONATE 50 MCG
1 SPRAY, SUSPENSION (ML) NASAL DAILY
Qty: 16 G | Refills: 3 | Status: SHIPPED | OUTPATIENT
Start: 2022-02-04

## 2022-02-04 NOTE — PATIENT INSTRUCTIONS
Instructions for Patients with Confirmed or Suspected COVID-19    If you are awaiting your test result, you will either be called or it will be released to the patient portal.  If you have any questions about your test, please visit [email or web address] or call our COVID-19 information line at [phone].      Please isolate yourself at home.  You may leave home and/or return to work once the following conditions are met:    If you have symptoms and tested positive:   More than 5 days since symptoms first appeared AND   More than 24 hours fever free without medications AND       symptoms have improved   · For five days after ending isolation, masks are required.    If you had no symptoms but tested positive:   More than 5 days since the date of the first positive test. If you develop symptoms, then use the guidelines above  · For five days after ending isolation, masks are required.      Testing is not recommended if you are symptom free after completing isolation.  Patient Education       Cough Discharge Instructions, Adult   About this topic   Many things can cause a cough. A cold or allergies can cause a cough. Other times, asthma or smoking can cause your cough. You can have a cough from mucus that drips down the back of your throat or from stomach acid that backs up into your throat. Sometimes a cough is a side effect from a drug. You may be waiting on some test results. If so, the staff will contact you if there are concerning results.  What care is needed at home?   · Ask your doctor what you need to do when you go home. Make sure you ask questions if you do not understand what the doctor says.  · To help you feel better:  ? Use a cool mist humidifier to avoid breathing dry air.  ? Use hard candy or cough drops to soothe sore throat and cough.  ? Gargle with salt water (mix 1/2 teaspoon salt with 1 cup warm water) a few times a day.  ? Spray saltwater mist in each nostril. Any normal saline  spray works.  ? Sip warm liquids to keep your throat moist.  ? Take warm, steamy showers to help soothe the cough.  · Do not smoke or be in smoke-filled places. Avoid things that may cause breathing problems like vaping, fumes, pollution, dust, and other common allergens.  · You may want to use over-the-counter medicines for allergies or acid reflux if your cough is due to one of these problems.  · You can also use an over-the-counter cough medicine.  What follow-up care is needed?   Your doctor may ask you to make visits to the office to check on your progress. Be sure to keep these visits.  What drugs may be needed?   The doctor may order drugs to:  · Control coughing  · Get rid of or thin mucus  · Block allergens if your cough is due to allergies  · Increase airflow if your cough is due to asthma or COPD  · Reduce swelling of the airways  · Reduce stomach acid if your cough is due to stomach acid problems  Will physical activity be limited?   Your cough may interfere with some of your activities.  What changes to diet are needed?   Some people feel milk products worsen their sputum production and worsen their cough. There is no proof that this is true. There is no need to reduce milk intake. Honey has been shown to be as effective as the leading over-the-counter (OTC) drug for calming coughs. Use 1/2 to 1 teaspoon (2.5 mL to 5 mL) of honey as needed. It can thin the secretions and loosen the cough. Never give honey to a child under the age of 1.  What can be done to prevent this health problem?   · Wash your hands often with soap and water for at least 20 seconds, especially after coughing or sneezing. Alcohol-based hand sanitizers also work to kill the virus.       · If you are sick, cover your mouth and nose with tissue when you cough or sneeze. You can also cough into your elbow. Throw away tissues in the trash and wash your hands after touching used tissues.  · Clean items and surfaces you most often touch  like door handles, remotes, phones, or toys. Wipe them with a disinfectant. This can help reduce the spread of infection.  · Do not get too close (kissing, hugging) to people who are sick.  · Do not share towels or hankies with anyone who is sick.  · Stay away from crowded places.  · Get a flu shot each year.     When do I need to call the doctor?   · You have chest pain when you cough or trouble breathing.  · You start to cough up blood or yellow or green mucus.  · You have a fever of 100.4°F (38°C) or higher or chills.  · You cough so hard you throw up.  · You are still coughing in 10 days.  Teach Back: Helping You Understand   The Teach Back Method helps you understand the information we are giving you. After you talk with the staff, tell them in your own words what you learned. This helps to make sure the staff has described each thing clearly. It also helps to explain things that may have been confusing. Before going home, make sure you can do these:  · I can tell you about my condition.  · I can tell you what I can do to help thin the mucus and ease my cough.  · I can tell you what I will do if I have wheezing, chest tightness, my lips turn blue with coughing, or I have other trouble breathing.  Where can I learn more?   [employer] Academy [employer]  https://familydoctor.org/cough-medicine-understanding-your-otc-options/   [facility]HS Choices  https://www.nhs.uk/conditions/cough/   Last Reviewed Date   2021-06-10  Consumer Information Use and Disclaimer   This information is not specific medical advice and does not replace information you receive from your health care provider. This is only a brief summary of general information. It does NOT include all information about conditions, illnesses, injuries, tests, procedures, treatments, therapies, discharge instructions or life-style choices that may apply to you. You must talk with your health care provider for complete information about your health and  treatment options. This information should not be used to decide whether or not to accept your health care providers advice, instructions or recommendations. Only your health care provider has the knowledge and training to provide advice that is right for you.  Copyright   Copyright © 2021 [employer] and its affiliates and/or licensors. All rights reserved.

## 2022-02-04 NOTE — PROGRESS NOTES
"Subjective:       Patient ID: Fletcher Nunez is a 67 y.o. male.    Vitals:  height is 5' 10" (1.778 m) and weight is 74.8 kg (165 lb). His temperature is 98.7 °F (37.1 °C). His blood pressure is 126/84 and his pulse is 84. His respiration is 16 and oxygen saturation is 97%.     Chief Complaint: Cough    Patient states he started having a cough and feeling congested 5 days ago. He states he has been taking dayquil and nyquil with mild relief. Patient has been COVID vaccinated but not had his booster.     Cough  This is a new problem. The current episode started in the past 7 days. The problem has been gradually worsening. The problem occurs constantly. The cough is non-productive. Associated symptoms include nasal congestion. Pertinent negatives include no chest pain, chills, ear congestion, ear pain, fever, headaches, myalgias, postnasal drip, sore throat or shortness of breath. He has tried OTC cough suppressant for the symptoms. The treatment provided mild relief.       Constitution: Negative for chills, sweating, fatigue and fever.   HENT: Positive for congestion. Negative for ear pain, postnasal drip, sinus pain, sinus pressure and sore throat.    Cardiovascular: Negative for chest pain.   Respiratory: Positive for cough. Negative for sputum production and shortness of breath.    Musculoskeletal: Negative for muscle ache.   Skin: Negative for erythema.   Neurological: Negative for headaches.       Objective:      Physical Exam   Constitutional: He is oriented to person, place, and time. He appears well-developed and well-nourished. He is cooperative.  Non-toxic appearance. He does not have a sickly appearance. He does not appear ill. No distress.   HENT:   Head: Normocephalic and atraumatic.   Ears:   Right Ear: Hearing, tympanic membrane, external ear and ear canal normal.   Left Ear: Hearing, tympanic membrane, external ear and ear canal normal.   Nose: Nose normal. No mucosal edema, rhinorrhea or nasal " deformity. No epistaxis. Right sinus exhibits no maxillary sinus tenderness and no frontal sinus tenderness. Left sinus exhibits no maxillary sinus tenderness and no frontal sinus tenderness.   Mouth/Throat: Uvula is midline, oropharynx is clear and moist and mucous membranes are normal. No trismus in the jaw. Normal dentition. No uvula swelling. No oropharyngeal exudate, posterior oropharyngeal edema or posterior oropharyngeal erythema.   Eyes: Conjunctivae and lids are normal. Pupils are equal, round, and reactive to light. No scleral icterus.      extraocular movement intact   Neck: Trachea normal and phonation normal. Neck supple. No edema present. No erythema present. No neck rigidity present.   Cardiovascular: Normal rate, regular rhythm, normal heart sounds, intact distal pulses and normal pulses.   Pulmonary/Chest: Effort normal and breath sounds normal. No stridor. No respiratory distress. He has no decreased breath sounds. He has no wheezes. He has no rhonchi. He has no rales.   Abdominal: Normal appearance. Soft. There is no abdominal tenderness.   Musculoskeletal: Normal range of motion.         General: No deformity or edema. Normal range of motion.   Neurological: He is alert and oriented to person, place, and time. He exhibits normal muscle tone. Coordination normal.   Skin: Skin is warm, dry, intact, not diaphoretic, not pale and no rash. Capillary refill takes less than 2 seconds. No bruising, No erythema and No lesion jaundice  Psychiatric: He has a normal mood and affect. His speech is normal and behavior is normal. Judgment and thought content normal. Cognition and memory  Nursing note and vitals reviewed.     Results for orders placed or performed in visit on 02/04/22   POCT COVID-19 Rapid Screening   Result Value Ref Range    POC Rapid COVID Positive (A) Negative     Acceptable Yes     No results found.       Assessment:       1. Congestion of nasal sinus    2. Cough    3.  COVID-19    4. Bronchitis          Plan:         Congestion of nasal sinus  -     POCT COVID-19 Rapid Screening  -     fluticasone propionate (FLONASE) 50 mcg/actuation nasal spray; 1 spray (50 mcg total) by Each Nostril route once daily.  Dispense: 16 g; Refill: 3    Cough  -     albuterol (VENTOLIN HFA) 90 mcg/actuation inhaler; Inhale 2 puffs into the lungs every 6 (six) hours as needed for Wheezing. Rescue  Dispense: 18 g; Refill: 0    COVID-19    Bronchitis  -     albuterol (VENTOLIN HFA) 90 mcg/actuation inhaler; Inhale 2 puffs into the lungs every 6 (six) hours as needed for Wheezing. Rescue  Dispense: 18 g; Refill: 0    Follow up if symptoms worsen or fail to improve, for F/U with PCP or ED.   Patient Instructions   Instructions for Patients with Confirmed or Suspected COVID-19    If you are awaiting your test result, you will either be called or it will be released to the patient portal.  If you have any questions about your test, please visit [email or web address] or call our COVID-19 information line at [phone].      Please isolate yourself at home.  You may leave home and/or return to work once the following conditions are met:    If you have symptoms and tested positive:   More than 5 days since symptoms first appeared AND   More than 24 hours fever free without medications AND       symptoms have improved   · For five days after ending isolation, masks are required.    If you had no symptoms but tested positive:   More than 5 days since the date of the first positive test. If you develop symptoms, then use the guidelines above  · For five days after ending isolation, masks are required.      Testing is not recommended if you are symptom free after completing isolation.  Patient Education       Cough Discharge Instructions, Adult   About this topic   Many things can cause a cough. A cold or allergies can cause a cough. Other times, asthma or smoking can cause your cough. You can have a  cough from mucus that drips down the back of your throat or from stomach acid that backs up into your throat. Sometimes a cough is a side effect from a drug. You may be waiting on some test results. If so, the staff will contact you if there are concerning results.  What care is needed at home?   · Ask your doctor what you need to do when you go home. Make sure you ask questions if you do not understand what the doctor says.  · To help you feel better:  ? Use a cool mist humidifier to avoid breathing dry air.  ? Use hard candy or cough drops to soothe sore throat and cough.  ? Gargle with salt water (mix 1/2 teaspoon salt with 1 cup warm water) a few times a day.  ? Spray saltwater mist in each nostril. Any normal saline spray works.  ? Sip warm liquids to keep your throat moist.  ? Take warm, steamy showers to help soothe the cough.  · Do not smoke or be in smoke-filled places. Avoid things that may cause breathing problems like vaping, fumes, pollution, dust, and other common allergens.  · You may want to use over-the-counter medicines for allergies or acid reflux if your cough is due to one of these problems.  · You can also use an over-the-counter cough medicine.  What follow-up care is needed?   Your doctor may ask you to make visits to the office to check on your progress. Be sure to keep these visits.  What drugs may be needed?   The doctor may order drugs to:  · Control coughing  · Get rid of or thin mucus  · Block allergens if your cough is due to allergies  · Increase airflow if your cough is due to asthma or COPD  · Reduce swelling of the airways  · Reduce stomach acid if your cough is due to stomach acid problems  Will physical activity be limited?   Your cough may interfere with some of your activities.  What changes to diet are needed?   Some people feel milk products worsen their sputum production and worsen their cough. There is no proof that this is true. There is no need to reduce milk intake.  Honey has been shown to be as effective as the leading over-the-counter (OTC) drug for calming coughs. Use 1/2 to 1 teaspoon (2.5 mL to 5 mL) of honey as needed. It can thin the secretions and loosen the cough. Never give honey to a child under the age of 1.  What can be done to prevent this health problem?   · Wash your hands often with soap and water for at least 20 seconds, especially after coughing or sneezing. Alcohol-based hand sanitizers also work to kill the virus.       · If you are sick, cover your mouth and nose with tissue when you cough or sneeze. You can also cough into your elbow. Throw away tissues in the trash and wash your hands after touching used tissues.  · Clean items and surfaces you most often touch like door handles, remotes, phones, or toys. Wipe them with a disinfectant. This can help reduce the spread of infection.  · Do not get too close (kissing, hugging) to people who are sick.  · Do not share towels or hankies with anyone who is sick.  · Stay away from crowded places.  · Get a flu shot each year.     When do I need to call the doctor?   · You have chest pain when you cough or trouble breathing.  · You start to cough up blood or yellow or green mucus.  · You have a fever of 100.4°F (38°C) or higher or chills.  · You cough so hard you throw up.  · You are still coughing in 10 days.  Teach Back: Helping You Understand   The Teach Back Method helps you understand the information we are giving you. After you talk with the staff, tell them in your own words what you learned. This helps to make sure the staff has described each thing clearly. It also helps to explain things that may have been confusing. Before going home, make sure you can do these:  · I can tell you about my condition.  · I can tell you what I can do to help thin the mucus and ease my cough.  · I can tell you what I will do if I have wheezing, chest tightness, my lips turn blue with coughing, or I have other trouble  breathing.  Where can I learn more?   [employer]  [email or web address]-[email or web address]   [facility] Choices  https://www.nhs.uk/conditions/[email or web address]   Last Reviewed Date   2021-06-10  Consumer Information Use and Disclaimer   This information is not specific medical advice and does not replace information you receive from your health care provider. This is only a brief summary of general information. It does NOT include all information about conditions, illnesses, injuries, tests, procedures, treatments, therapies, discharge instructions or life-style choices that may apply to you. You must talk with your health care provider for complete information about your health and treatment options. This information should not be used to decide whether or not to accept your health care providers advice, instructions or recommendations. Only your health care provider has the knowledge and training to provide advice that is right for you.  Copyright   Copyright © 2021 [employer] and its affiliates and/or licensors. All rights reserved.